# Patient Record
Sex: MALE | Race: WHITE | NOT HISPANIC OR LATINO | Employment: OTHER | URBAN - METROPOLITAN AREA
[De-identification: names, ages, dates, MRNs, and addresses within clinical notes are randomized per-mention and may not be internally consistent; named-entity substitution may affect disease eponyms.]

---

## 2017-02-14 ENCOUNTER — HOSPITAL ENCOUNTER (OUTPATIENT)
Dept: MRI IMAGING | Facility: HOSPITAL | Age: 57
Discharge: HOME | End: 2017-02-14
Attending: INTERNAL MEDICINE | Admitting: INTERNAL MEDICINE

## 2017-02-14 DIAGNOSIS — C31.1 PRIMARY SQUAMOUS CELL CARCINOMA OF ETHMOIDAL SINUS (HCC): ICD-10-CM

## 2017-02-14 PROCEDURE — 70553 MRI BRAIN STEM W/O & W/DYE: CPT

## 2017-02-14 PROCEDURE — 0 GADOBENATE DIMEGLUMINE 529 MG/ML SOLUTION: Performed by: INTERNAL MEDICINE

## 2017-02-14 PROCEDURE — A9577 INJ MULTIHANCE: HCPCS | Performed by: INTERNAL MEDICINE

## 2017-02-14 RX ADMIN — GADOBENATE DIMEGLUMINE 17 ML: 529 INJECTION, SOLUTION INTRAVENOUS at 09:16

## 2017-02-21 ENCOUNTER — APPOINTMENT (OUTPATIENT)
Dept: LAB | Facility: HOSPITAL | Age: 57
End: 2017-02-21

## 2017-02-21 ENCOUNTER — APPOINTMENT (OUTPATIENT)
Dept: ONCOLOGY | Facility: CLINIC | Age: 57
End: 2017-02-21

## 2017-02-21 ENCOUNTER — OFFICE VISIT (OUTPATIENT)
Dept: ONCOLOGY | Facility: CLINIC | Age: 57
End: 2017-02-21

## 2017-02-21 ENCOUNTER — LAB (OUTPATIENT)
Dept: LAB | Facility: HOSPITAL | Age: 57
End: 2017-02-21

## 2017-02-21 VITALS
HEART RATE: 78 BPM | SYSTOLIC BLOOD PRESSURE: 100 MMHG | OXYGEN SATURATION: 99 % | BODY MASS INDEX: 26.72 KG/M2 | TEMPERATURE: 97.8 F | HEIGHT: 69 IN | RESPIRATION RATE: 14 BRPM | WEIGHT: 180.4 LBS | DIASTOLIC BLOOD PRESSURE: 68 MMHG

## 2017-02-21 DIAGNOSIS — C31.1 PRIMARY SQUAMOUS CELL CARCINOMA OF ETHMOIDAL SINUS (HCC): Primary | ICD-10-CM

## 2017-02-21 DIAGNOSIS — C31.1 PRIMARY SQUAMOUS CELL CARCINOMA OF ETHMOIDAL SINUS (HCC): ICD-10-CM

## 2017-02-21 LAB
BASOPHILS # BLD AUTO: 0.03 10*3/MM3 (ref 0–0.1)
BASOPHILS NFR BLD AUTO: 0.6 % (ref 0–1.1)
DEPRECATED RDW RBC AUTO: 42.2 FL (ref 37–49)
EOSINOPHIL # BLD AUTO: 0.2 10*3/MM3 (ref 0–0.36)
EOSINOPHIL NFR BLD AUTO: 4.1 % (ref 1–5)
ERYTHROCYTE [DISTWIDTH] IN BLOOD BY AUTOMATED COUNT: 12.9 % (ref 11.7–14.5)
HCT VFR BLD AUTO: 41.4 % (ref 40–49)
HGB BLD-MCNC: 13.8 G/DL (ref 13.5–16.5)
IMM GRANULOCYTES # BLD: 0.04 10*3/MM3 (ref 0–0.03)
IMM GRANULOCYTES NFR BLD: 0.8 % (ref 0–0.5)
LYMPHOCYTES # BLD AUTO: 1.25 10*3/MM3 (ref 1–3.5)
LYMPHOCYTES NFR BLD AUTO: 25.4 % (ref 20–49)
MCH RBC QN AUTO: 29.7 PG (ref 27–33)
MCHC RBC AUTO-ENTMCNC: 33.3 G/DL (ref 32–35)
MCV RBC AUTO: 89.2 FL (ref 83–97)
MONOCYTES # BLD AUTO: 0.53 10*3/MM3 (ref 0.25–0.8)
MONOCYTES NFR BLD AUTO: 10.8 % (ref 4–12)
NEUTROPHILS # BLD AUTO: 2.87 10*3/MM3 (ref 1.5–7)
NEUTROPHILS NFR BLD AUTO: 58.3 % (ref 39–75)
NRBC BLD MANUAL-RTO: 0.6 /100 WBC (ref 0–0)
PLATELET # BLD AUTO: 227 10*3/MM3 (ref 150–375)
PMV BLD AUTO: 9.4 FL (ref 8.9–12.1)
RBC # BLD AUTO: 4.64 10*6/MM3 (ref 4.3–5.5)
WBC NRBC COR # BLD: 4.92 10*3/MM3 (ref 4–10)

## 2017-02-21 PROCEDURE — 99214 OFFICE O/P EST MOD 30 MIN: CPT | Performed by: INTERNAL MEDICINE

## 2017-02-21 PROCEDURE — 85025 COMPLETE CBC W/AUTO DIFF WBC: CPT | Performed by: INTERNAL MEDICINE

## 2017-02-21 PROCEDURE — 36416 COLLJ CAPILLARY BLOOD SPEC: CPT | Performed by: INTERNAL MEDICINE

## 2017-02-21 NOTE — PROGRESS NOTES
[facility] GROUP OUTPATIENT FOLLOW UP CLINIC VISIT    REASON FOR FOLLOW-UP:      1. 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   2. Status post resection on 09/17/2012 at [facility].   3. Adjuvant concurrent chemoradiation initiated on 10/25/2012.   4. Sixth and final cycle of weekly carboplatin/paclitaxel administered on 11/29/2012.   5. Last radiation on 12/03/2012.   6. Hospitalized from 12/06/2012 to 12/18/2012 with falls. 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   7. Followed by Dr. Cassidy, Neurosurgery at [facility] and MRI performed at [facility] showed a 2.5 cm mass in the right frontal lobe. The patient underwent right frontal craniotomy for resection of the tumor. It was a cystic right frontal tumor. Dr. Cassidy was able to remove it en bloc on 7/20/2015. The pathology was consistent with a non-small cell carcinoma, high grade. It was a 2.3 x 2.3 x 0.6 tumor consistent with high grade non-small cell carcinoma consistent with previous primary.  8. He became less interactive with urinary incontinence, and intracranial recurrence was again noted with repeat right frontal craniotomy at  on 11/15/2016.  The operative note indicates complete removal of the tumor.      9. PET scan and MRI of the brain on 12/14/2016 and 12/18/2016 respectively show no metastatic disease and no definite evidence of residual intracranial malignancy.  Dr. Tran does not plan radiation therapy.  Observation pursued.  [facility] NGS assessment requested.  10. 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  PD-L1 testing pending as of 2/21/2017.  11. MRI imaging on 2/14/2017 shows evidence of recurrence in total measuring 19 x 16 x 15 mm.  Dr. Tran plans radiation.      HISTORY OF PRESENT ILLNESS:  Irving Albarran is a 56 y.o. male  "who returns today for follow up of the above issue to review MRI imaging.  He hasn't been doing as well lately.  His family has noticed some personality changes.  He is a little bit more withdrawn.  He has also been having some headaches in the right frontal area.  He takes an aspirin about twice weekly for this.  No focal weakness.  No seizures.      PAST MEDICAL, SURGICAL, FAMILY, AND SOCIAL HISTORIES were reviewed with the patient and in the electronic medical record, and were updated if indicated.    ALLERGIES:  Allergies   Allergen Reactions   • Morphine And Related        MEDICATIONS:  The medication list has been reviewed with the patient by the medical assistant, and the list has been updated in the electronic medical record, which I reviewed.  Medication dosages and frequencies were confirmed to be accurate.    REVIEW OF SYSTEMS:  PAIN:  See Vital Signs below.  GENERAL:  No fevers, chills, night sweats, or unintended weight loss.  See history of present illness  SKIN:  No rash  HEME/LYMPH:  No abnormal bleeding.  No palpable lymphadenopathy.  EYES:  No vision changes or diplopia.  Some tearing of the left eye.  ENT:  No sore throat or difficulty swallowing.  RESPIRATORY:  No cough, shortness of breath, hemoptysis, or wheezing.  Occasional tachypnea which is a behavioral issue.  CARDIOVASCULAR:  No chest pain, palpitations, orthopnea, or dyspnea on exertion.  GASTROINTESTINAL:  No abdominal pain, nausea, vomiting, constipation, diarrhea, melena, or hematochezia.  GENITOURINARY:  No dysuria or hematuria.  MUSCULOSKELETAL:  No joint pain, swelling, or erythema.  NEUROLOGIC:  No dizziness, loss of consciousness, or seizures.  PSYCHIATRIC:  See history of present illness    Vitals:    02/21/17 1407   BP: 100/68   Pulse: 78   Resp: 14   Temp: 97.8 °F (36.6 °C)   TempSrc: Oral   SpO2: 99%   Weight: 180 lb 6.4 oz (81.8 kg)   Height: 68.9\" (175 cm)   PainSc: 0-No pain       PHYSICAL EXAMINATION:  GENERAL:  " Well-developed well-nourished male; awake, alert and oriented, in no acute distress.  SKIN:  Small erythematous and tender papules on the left frontal scalp improved  HEAD:  Microcephaly is noted.  Healed surgical incision at the frontal hairline.  EYES:  Pupils equal, round and reactive to light.  Extraocular movements intact.  Conjunctivae normal.  EARS:  Hearing intact.  NOSE:  Septum midline.  No excoriations or nasal discharge.  MOUTH:  No stomatitis or ulcers.  Lips are normal.  THROAT:  Oropharynx without lesions or exudates.  NECK:  Supple with good range of motion; no thyromegaly or masses; no JVD or bruits.  LYMPHATICS:  No cervical, supraclavicular, axillary, or inguinal lymphadenopathy.  CHEST:  Lungs are clear to auscultation bilaterally.  No wheezes, rales, or rhonchi.  HEART:  Regular rate; normal rhythm.  No murmurs, gallops or rubs.  ABDOMEN:  Soft, non-tender, non-distended.  Normal active bowel sounds.  No organomegaly.  EXTREMITIES:  No clubbing, cyanosis, or edema.  NEUROLOGICAL:  No focal neurologic deficits.    DIAGNOSTIC DATA:  Lab Results   Component Value Date    WBC 4.92 02/21/2017    HGB 13.8 02/21/2017    HCT 41.4 02/21/2017    MCV 89.2 02/21/2017     02/21/2017       IMAGING:  MRI brain imaging from 2/14/2017 personally reviewed.  19 mm dural based area of enhancement consistent with recurrence present deep to the craniotomy flap in the right frontal area.      ASSESSMENT:  This is a 56 y.o. male with:   1.  History of stage IV poorly differentiated carcinoma of the left ethmoid sinus, status post surgery followed by chemoradiation.  2.  Frontal lobe metastatic lesion, resected, consistent with his previous tumor.  Second recurrence also now resected on 11/15/2016.  We initially observed.  There is no evidence for local recurrence on the most recent MRI.  I spoke with Dr. Tran with radiation oncology this morning and he does plan for 5-6 weeks of radiation.  I discussed the  case with Dr. Pelletier with our practice and we do not favor any concurrent systemic therapy under the circumstances.  Down the road, if there is further evidence for recurrence and radiation and surgery are no longer options, we will discuss the possibility of some systemic therapy if possible although this is a difficult situation and systemic drugs may not be very effective.  PD-L1 testing is complete but the result is not immediately available to me and we're working on getting this result.  Mutation burden on next generation sequencing was low.  Otherwise neck generation sequencing did not provide any meaningful targets for therapy.    3.  Headache likely secondary to recurrent carcinoma: Hopefully this improves with radiation.  4.  Personality changes: Again, this is likely secondary to progressive and recurrent intracranial metastatic disease.  Hopefully this improves with time.  No other changes on MRI imaging to suggest a reason for his personality change.    PLAN:  1.  Dr. Tran plans to complete radiation therapy.  I anticipate this will be done somewhere around the end of March.  2.  We do not plan for any systemic therapy at this time concurrent with radiation.  3.  I will plan to see him back around the end of May with MRI imaging of the brain done prior to that.

## 2017-02-24 ENCOUNTER — APPOINTMENT (OUTPATIENT)
Dept: RADIATION ONCOLOGY | Facility: HOSPITAL | Age: 57
End: 2017-02-24

## 2017-02-24 ENCOUNTER — OFFICE VISIT (OUTPATIENT)
Dept: RADIATION ONCOLOGY | Facility: HOSPITAL | Age: 57
End: 2017-02-24

## 2017-02-24 VITALS
SYSTOLIC BLOOD PRESSURE: 119 MMHG | WEIGHT: 182 LBS | HEART RATE: 65 BPM | OXYGEN SATURATION: 95 % | TEMPERATURE: 97 F | DIASTOLIC BLOOD PRESSURE: 71 MMHG | RESPIRATION RATE: 16 BRPM | BODY MASS INDEX: 26.95 KG/M2

## 2017-02-24 DIAGNOSIS — C31.1 PRIMARY SQUAMOUS CELL CARCINOMA OF ETHMOIDAL SINUS (HCC): Primary | ICD-10-CM

## 2017-02-24 PROCEDURE — 99204 OFFICE O/P NEW MOD 45 MIN: CPT

## 2017-02-24 PROCEDURE — 77334 RADIATION TREATMENT AID(S): CPT

## 2017-02-24 PROCEDURE — G0463 HOSPITAL OUTPT CLINIC VISIT: HCPCS

## 2017-02-24 PROCEDURE — 77263 THER RADIOLOGY TX PLNG CPLX: CPT

## 2017-02-24 PROCEDURE — 77290 THER RAD SIMULAJ FIELD CPLX: CPT

## 2017-02-26 PROCEDURE — 77370 RADIATION PHYSICS CONSULT: CPT

## 2017-02-27 NOTE — PROGRESS NOTES
2/27/2017      Radiation Therapy Consultation:  Mr. Irving Albarran   ( 1960 )    History of Present Illness:  Mr. Irving Albarran is a 56 y.o. male who is here to discuss treatment options for his thrice recurrent SCC of the sinuses. . He is referred by Dr. Gonzalez.  Irving's cancer began in the summer of 2012 with involvement of the left frontal, medial right frontal, ethmoid, and sphenoid sinuses. He had surgery for this at [facility] followed by chemoradiation here, concluding in October of 2012. Since then he has been followed at [facility] and has had two intracranial recurrences treated by surgery. In November of 2016 MRI revealed changes in the operative site on his RIGHT, adjacent to and underlying the right frontal pole just above the right orbit. Further MRI comparisons in December of 2016 and last week show steady increase in size of the area in question and abnormal enhancement of same. No further surgery nor biopsy is planned. Irving and his sister-in-law, Raiza are here today to discuss radiation therapy in a salvage attempt as recommended by Dr. Cassidy and Dr. Gnozalez.    Active Problem List:     Patient Active Problem List   Diagnosis   • Carcinoma   • Primary squamous cell carcinoma of ethmoidal sinus   • Microcephaly        Past Medical History: he  has a past medical history of Carcinoma; Delusions; Depression; Learning disability; and Mood swings.    Past Surgical History:  he has a past surgical history that includes Sinus surgery (09/2012) and Cataract extraction.    Meds:    Current Outpatient Prescriptions:   •  FLUoxetine (PROzac) 40 MG capsule, Take 40 mg by mouth daily., Disp: , Rfl:   •  OLANZapine (ZyPREXA) 20 MG tablet, , Disp: , Rfl: 0  •  QUEtiapine (SEROquel) 400 MG tablet, Take 400 mg by mouth every night., Disp: , Rfl:     Allergies:  is allergic to morphine and related.    Family History:  his family history includes Diabetes in his maternal grandmother; Heart disease in his father;  Hypertension in his brother, mother, and sister; Kidney cancer (age of onset: 77) in his father; Melanoma in his mother.    Social History:  he  reports that he has never smoked. He does not have any smokeless tobacco history on file. He reports that he drinks alcohol. He reports that he does not use illicit drugs.    Pertinent Findings on   Review of Systems - Oncology Irving's ROS in negative because he never complains about anything. Raiza says he has a right sided headache about once a week for which he takes a single Tylenol tablet with prompt relief.    Vital Signs    Vitals:    02/24/17 1508   BP: 119/71   Pulse: 65   Resp: 16   Temp: 97 °F (36.1 °C)   TempSrc: Oral   SpO2: 95%   Weight: 182 lb (82.6 kg)   PainSc: 0-No pain       Pertinent Findings on Problem Focused Exam:  Physical Exam  No physical findings pertinent  to the purpose of their visit today. Specifically he is neurologically intact and in amazingly good shape for what he has been thru over the past five years.    Pertinent Findings on Problem Focused Imaging:  Dr. Porter describes, on MRI of 02/21/17, enlargement of the solid enhancing area deep to the right craniotomy flap and the development of a new site of rim-enhancing tissue adjacent. The combination of the two findings measure 19 x 16 x 15 mm; dural based over the far inferior-lateral right frontal lobe deep to the inferior portion of the craniotomy flap.    Karnofsky Performance Status:  100 - Fully active, able to carry on all pre-disease performed without restriction    Impression: T4b  N0   M x cancer of of paranasal sinuses, now recurrent intracranially as above described.    Summary of Our Discussion :      We discussed the diagnosis, stage, standard of care in the [place]. We discussed the  intent of the treatment (curative, palliative, prophylactic). We discussed the role of radiation therapy in the treatment plan both in generality and in specific. With regard to the latter  we discussed the logistics of treatment, the number of treatments, the side effects,  both probable and possible. We discussed potential after-effects,  both probable and possible. We discussed both intra-treatment and post-treatment coordination of care.     We discussed the planning process which will include immobilization devices required to assure high precision treatment and we discussed the planning CT scan (which will not require administration of any contrast agent).  All questions answered.     We began the planning process today with manufacture of the immobilization devices and the planning scan. We intend to begin actual treatment as soon as the computerized treatment planning can be finished.      Plan: Final decision on time/dose fractionation must await several hours of dosimetric work. The consultaiton will be addended at that time.        I appreciate being asked to see  Priyank Irving Deion in consultation.      Maurizio Tran MD         On 02/24/17 , I was with PriyankIrving Marcysavita  And  from 3:13 pm  until  4:05 pm  of which  45 minutes was face to face  Of that face to face time, 45 minutes  was spent in counseling and coordination of care as indicated in the Summary of Our Discussion, above.

## 2017-03-01 ENCOUNTER — APPOINTMENT (OUTPATIENT)
Dept: RADIATION ONCOLOGY | Facility: HOSPITAL | Age: 57
End: 2017-03-01

## 2017-03-02 PROCEDURE — 77301 RADIOTHERAPY DOSE PLAN IMRT: CPT

## 2017-03-02 PROCEDURE — 77470 SPECIAL RADIATION TREATMENT: CPT

## 2017-03-03 PROCEDURE — 77300 RADIATION THERAPY DOSE PLAN: CPT

## 2017-03-03 PROCEDURE — 77470 SPECIAL RADIATION TREATMENT: CPT

## 2017-03-06 PROCEDURE — 77338 DESIGN MLC DEVICE FOR IMRT: CPT

## 2017-03-06 PROCEDURE — 77427 RADIATION TX MANAGEMENT X5: CPT

## 2017-03-06 PROCEDURE — 77014 CHG CT GUIDANCE RADIATION THERAPY FLDS PLACEMENT: CPT

## 2017-03-06 PROCEDURE — 77386: CPT

## 2017-03-06 PROCEDURE — 77386 CHG INTENSITY MODULATED RADIATION TX DLVR COMPLEX: CPT

## 2017-03-07 PROCEDURE — 77386 CHG INTENSITY MODULATED RADIATION TX DLVR COMPLEX: CPT

## 2017-03-07 PROCEDURE — 77014 CHG CT GUIDANCE RADIATION THERAPY FLDS PLACEMENT: CPT

## 2017-03-07 PROCEDURE — 77386: CPT

## 2017-03-08 PROCEDURE — 77014 CHG CT GUIDANCE RADIATION THERAPY FLDS PLACEMENT: CPT

## 2017-03-08 PROCEDURE — 77386 CHG INTENSITY MODULATED RADIATION TX DLVR COMPLEX: CPT

## 2017-03-08 PROCEDURE — 77386: CPT

## 2017-03-09 PROCEDURE — 77336 RADIATION PHYSICS CONSULT: CPT

## 2017-03-09 PROCEDURE — 77386: CPT

## 2017-03-09 PROCEDURE — 77014 CHG CT GUIDANCE RADIATION THERAPY FLDS PLACEMENT: CPT

## 2017-03-09 PROCEDURE — 77386 CHG INTENSITY MODULATED RADIATION TX DLVR COMPLEX: CPT

## 2017-03-10 ENCOUNTER — OFFICE VISIT (OUTPATIENT)
Dept: INTERNAL MEDICINE | Facility: CLINIC | Age: 57
End: 2017-03-10

## 2017-03-10 ENCOUNTER — RADIATION ONCOLOGY WEEKLY ASSESSMENT (OUTPATIENT)
Dept: RADIATION ONCOLOGY | Facility: HOSPITAL | Age: 57
End: 2017-03-10

## 2017-03-10 VITALS
HEIGHT: 69 IN | OXYGEN SATURATION: 98 % | DIASTOLIC BLOOD PRESSURE: 90 MMHG | SYSTOLIC BLOOD PRESSURE: 102 MMHG | HEART RATE: 84 BPM | WEIGHT: 182 LBS | BODY MASS INDEX: 26.96 KG/M2

## 2017-03-10 DIAGNOSIS — F79 MENTAL RETARDATION: ICD-10-CM

## 2017-03-10 DIAGNOSIS — C31.1 PRIMARY SQUAMOUS CELL CARCINOMA OF ETHMOIDAL SINUS (HCC): Primary | ICD-10-CM

## 2017-03-10 DIAGNOSIS — L29.9 PRURITIC CONDITION: ICD-10-CM

## 2017-03-10 DIAGNOSIS — Z00.00 ANNUAL PHYSICAL EXAM: ICD-10-CM

## 2017-03-10 DIAGNOSIS — N40.0 BENIGN NON-NODULAR PROSTATIC HYPERPLASIA, PRESENCE OF LOWER URINARY TRACT SYMPTOMS UNSPECIFIED: ICD-10-CM

## 2017-03-10 PROCEDURE — 93000 ELECTROCARDIOGRAM COMPLETE: CPT | Performed by: INTERNAL MEDICINE

## 2017-03-10 PROCEDURE — 71020 CHG CHEST X-RAY 2 VW: CPT | Performed by: RADIOLOGY

## 2017-03-10 PROCEDURE — 77386: CPT

## 2017-03-10 PROCEDURE — 71020 XR CHEST 2 VW: CPT | Performed by: INTERNAL MEDICINE

## 2017-03-10 PROCEDURE — 99396 PREV VISIT EST AGE 40-64: CPT | Performed by: INTERNAL MEDICINE

## 2017-03-10 PROCEDURE — 77014 CHG CT GUIDANCE RADIATION THERAPY FLDS PLACEMENT: CPT

## 2017-03-10 PROCEDURE — 77386 CHG INTENSITY MODULATED RADIATION TX DLVR COMPLEX: CPT

## 2017-03-10 RX ORDER — HYDROXYZINE HYDROCHLORIDE 10 MG/1
10 TABLET, FILM COATED ORAL 3 TIMES DAILY PRN
Qty: 30 TABLET | Refills: 0 | Status: SHIPPED | OUTPATIENT
Start: 2017-03-10 | End: 2017-05-16 | Stop reason: SDUPTHER

## 2017-03-10 NOTE — PROGRESS NOTES
Subjective   Irving Albarran is a 56 y.o. male.     HPI Comments: Annual Physical New pt Here to get established Has Squamous cell ca of Ethmoid sinus       The following portions of the patient's history were reviewed and updated as appropriate: allergies, current medications, past family history, past medical history, past social history, past surgical history and problem list.    Review of Systems   Constitutional:        Feeling fine    Eyes: Negative.    Respiratory: Negative.    Cardiovascular: Negative.  Negative for chest pain and palpitations.   Gastrointestinal: Negative.    Genitourinary:        Sister in law Shireen indicates pt has to take a long time to urinate   Musculoskeletal: Negative.    Neurological: Positive for headaches (Has headaches from cancer).        Has squamous cell ca of ethmoid sinus W/ brain mets Goes to [facility] for craniotomies Has had chemo & gets Rad TX here @ [facility]   Psychiatric/Behavioral: The patient is hyperactive.         Is moderate mental retardation & asociated behavior D/O  On TX & see psychiatrist       Objective   Physical Exam   Constitutional: He is oriented to person, place, and time. He appears well-developed and well-nourished.   HENT:   Head: Normocephalic.   Nose: Nose normal.   Mouth/Throat: Oropharynx is clear and moist.   Canals impacted bialt   Eyes: Conjunctivae and EOM are normal. Pupils are equal, round, and reactive to light.   Lens implants   Neck: Normal range of motion. Neck supple.   Cardiovascular: Normal rate, regular rhythm, normal heart sounds and intact distal pulses.    Repeat 110/80   Pulmonary/Chest: Effort normal and breath sounds normal.   Abdominal: Soft. Bowel sounds are normal. He exhibits no distension and no mass. There is no tenderness.   Genitourinary: Rectum normal and penis normal.   Genitourinary Comments: Prostate moderately enlarged & firm   Musculoskeletal: Normal range of motion.   Neurological: He is alert and oriented to person,  place, and time. He has normal reflexes.   Skin:   Multiple craniotomy scars on vertex of scalp   Vitals reviewed.        Assessment/Plan   Irving was seen today for annual exam.    Diagnoses and all orders for this visit:    Primary squamous cell carcinoma of ethmoidal sinus    Annual physical exam  -     CBC Auto Differential; Future  -     Comprehensive Metabolic Panel; Future  -     Lipid Panel; Future  -     TSH; Future  -     T4, Free; Future  -     XR Chest 2 View    Mental retardation    Pruritic condition  -     hydrOXYzine (ATARAX) 10 MG tablet; Take 1 tablet by mouth 3 (Three) Times a Day As Needed for Itching.    Benign non-nodular prostatic hyperplasia, presence of lower urinary tract symptoms unspecified  -     PSA; Future    Other orders  -     ECG 12 Lead      ECG 12 Lead  Date/Time: 3/10/2017 10:09 AM  Performed by: KUSHAL VILLARREAL  Authorized by: KUSHAL VILLARREAL   Rhythm: sinus rhythm  Conduction: conduction normal  ST Segments: ST segments normal  T Waves: T waves normal  Other: no other findings  Clinical impression: normal ECG  Comments: No priors

## 2017-03-13 PROCEDURE — 77386: CPT

## 2017-03-13 PROCEDURE — 77386 CHG INTENSITY MODULATED RADIATION TX DLVR COMPLEX: CPT

## 2017-03-13 PROCEDURE — 77014 CHG CT GUIDANCE RADIATION THERAPY FLDS PLACEMENT: CPT

## 2017-03-13 PROCEDURE — 77427 RADIATION TX MANAGEMENT X5: CPT

## 2017-03-14 PROCEDURE — 77386 CHG INTENSITY MODULATED RADIATION TX DLVR COMPLEX: CPT

## 2017-03-14 PROCEDURE — 77014 CHG CT GUIDANCE RADIATION THERAPY FLDS PLACEMENT: CPT

## 2017-03-14 PROCEDURE — 77386: CPT

## 2017-03-15 ENCOUNTER — RADIATION ONCOLOGY WEEKLY ASSESSMENT (OUTPATIENT)
Dept: RADIATION ONCOLOGY | Facility: HOSPITAL | Age: 57
End: 2017-03-15

## 2017-03-15 VITALS
SYSTOLIC BLOOD PRESSURE: 130 MMHG | BODY MASS INDEX: 26.95 KG/M2 | WEIGHT: 182 LBS | HEART RATE: 72 BPM | OXYGEN SATURATION: 99 % | DIASTOLIC BLOOD PRESSURE: 85 MMHG

## 2017-03-15 DIAGNOSIS — C31.1 PRIMARY SQUAMOUS CELL CARCINOMA OF ETHMOIDAL SINUS (HCC): Primary | ICD-10-CM

## 2017-03-15 PROCEDURE — 77014 CHG CT GUIDANCE RADIATION THERAPY FLDS PLACEMENT: CPT

## 2017-03-15 PROCEDURE — 77386 CHG INTENSITY MODULATED RADIATION TX DLVR COMPLEX: CPT

## 2017-03-15 PROCEDURE — 77386: CPT

## 2017-03-15 NOTE — PROGRESS NOTES
Physician Weekly Management Note    Diagnosis:     1. Primary squamous cell carcinoma of ethmoidal sinus        Reason for Visit:   Tx 8    Concurrent Chemo:   No    Notes on Treatment course, Films, Medical progress and Plan:  Doing well. No problems or questions, cont on.    ROS:    Constitutional - Normal - no complaints of fatigue, denies lack of appetite, fever, night sweats or change in weight.  Neck - Normal - denies neck masses, muscle weakness, neck pain, decreased range of motion or swelling.  Cardiovascular - Normal - denies arrhythmias, chest pain, dyspnea, edema, orthopnea or palpitations.  Respiratory - Normal - denies cough, dyspnea, hemoptysis, hiccoughs, pleuritic chest pain or wheezing.  Gastrointestinal - Normal - no complaints of constipation, abdominal pain, diarrhea, heartburn/dyspepsia, hematemesis, hemorrhoids, melena or GI bleeding, nausea, pain or cramping or vomiting.    PHYSICAL EXAM:  Vitals:    03/15/17 1007   BP: 130/85   Pulse: 72   SpO2: 99%   Weight: 182 lb (82.6 kg)   PainSc: 0-No pain       Constitutional - Normal - no evidence of impaired alertness, inadequate appearance, premature or advanced chronologic age, uncooperativeness, altered mood, affect or disorientation.  Neck - Normal - no evidence of tender or enlarged lymph nodes, neck abnormalities, restricted range of motion or enlarged thyroid.  Chest - Normal - no evidence of chest abnormalities, tender or enlarged lymph nodes.  Respiratory - Normal - no evidence of abnormal breat sounds, chest abnormalities on palpation and chest abnormalities on percussion and normal breath sounds.  Hematologic/Lymphatic - Normal - no evidence of tender or enlarged axillary lymph nodes nor tender or enlarged neck nodes.    Performance Status:  (2) Ambulatory and capable of self care, unable to carry out work activity, up and about > 50% or waking hours    Problem added:  No problems updated.  Medications added: No orders of the defined  "types were placed in this encounter.    Ancillary referrals made: No orders of the defined types were placed in this encounter.      Technical aspects reviewed:  Weekly OBI approved if applicable? Yes  Weekly port films approved?   Yes  Change requests noted if applicable?  No  Patient setup and plan reviewed?  Yes    Chart Reviewed:  Continue current treatment plan?   Yes  Treatment plan change requested?  No    I have reviewed and marked as \"reviewed\" the current medications, allergies and problem list in the patients EMR.  I have reviewed the patient's medical, surgical  history in detail, reviewed any pertinent lab work and updated the computerized patient record if needed.    Patient's Care Team:  Patient Care Team:  Solo Alonso MD as PCP - General (Internal Medicine)    Seen and approved by:  Ella Pandya MD, 03/15/2017  "

## 2017-03-16 PROCEDURE — 77386: CPT

## 2017-03-16 PROCEDURE — 77386 CHG INTENSITY MODULATED RADIATION TX DLVR COMPLEX: CPT

## 2017-03-16 PROCEDURE — 77014 CHG CT GUIDANCE RADIATION THERAPY FLDS PLACEMENT: CPT

## 2017-03-16 PROCEDURE — 77336 RADIATION PHYSICS CONSULT: CPT

## 2017-03-17 PROCEDURE — 77386 CHG INTENSITY MODULATED RADIATION TX DLVR COMPLEX: CPT

## 2017-03-17 PROCEDURE — 77014 CHG CT GUIDANCE RADIATION THERAPY FLDS PLACEMENT: CPT

## 2017-03-17 PROCEDURE — 77386: CPT

## 2017-03-20 ENCOUNTER — RADIATION ONCOLOGY WEEKLY ASSESSMENT (OUTPATIENT)
Dept: RADIATION ONCOLOGY | Facility: HOSPITAL | Age: 57
End: 2017-03-20

## 2017-03-20 VITALS
DIASTOLIC BLOOD PRESSURE: 73 MMHG | TEMPERATURE: 97 F | BODY MASS INDEX: 26.95 KG/M2 | WEIGHT: 182 LBS | SYSTOLIC BLOOD PRESSURE: 113 MMHG | RESPIRATION RATE: 16 BRPM | HEART RATE: 86 BPM | OXYGEN SATURATION: 98 %

## 2017-03-20 DIAGNOSIS — C31.1 PRIMARY SQUAMOUS CELL CARCINOMA OF ETHMOIDAL SINUS (HCC): Primary | ICD-10-CM

## 2017-03-20 PROCEDURE — 77427 RADIATION TX MANAGEMENT X5: CPT

## 2017-03-20 PROCEDURE — 77386 CHG INTENSITY MODULATED RADIATION TX DLVR COMPLEX: CPT

## 2017-03-20 PROCEDURE — 77014 CHG CT GUIDANCE RADIATION THERAPY FLDS PLACEMENT: CPT

## 2017-03-20 PROCEDURE — 77386: CPT

## 2017-03-20 NOTE — PROGRESS NOTES
Physician Weekly Management Note    Diagnosis:     1. Primary squamous cell carcinoma of ethmoidal sinus        Reason for Visit:   Tx 11    Concurrent Chemo:   No    Notes on Treatment course, Films, Medical progress and Plan:  Complaints of mild erythema and swelling of the left eye over the weekend. States he has a mild headache there. Slight skin change in portal shape in center, eyes both fine now. Encouraged tylenol as needed and reviewed field shape with patient and sister in law. Let us know if things worsen again.    ROS:    Constitutional - Normal - no complaints of fatigue, denies lack of appetite, fever, night sweats or change in weight.  Neck - Normal - denies neck masses, muscle weakness, neck pain, decreased range of motion or swelling.  Cardiovascular - Normal - denies arrhythmias, chest pain, dyspnea, edema, orthopnea or palpitations.  Respiratory - Normal - denies cough, dyspnea, hemoptysis, hiccoughs, pleuritic chest pain or wheezing.  Gastrointestinal - Normal - no complaints of constipation, abdominal pain, diarrhea, heartburn/dyspepsia, hematemesis, hemorrhoids, melena or GI bleeding, nausea, pain or cramping or vomiting.    PHYSICAL EXAM:  Vitals:    03/20/17 1000   BP: 113/73   Pulse: 86   Resp: 16   Temp: 97 °F (36.1 °C)   TempSrc: Oral   SpO2: 98%   Weight: 182 lb (82.6 kg)   PainSc:   9   PainLoc: Head       Constitutional - Normal - no evidence of impaired alertness, inadequate appearance, premature or advanced chronologic age, uncooperativeness, altered mood, affect or disorientation.  Neck - Normal - no evidence of tender or enlarged lymph nodes, neck abnormalities, restricted range of motion or enlarged thyroid.  Chest - Normal - no evidence of chest abnormalities, tender or enlarged lymph nodes.  Respiratory - Normal - no evidence of abnormal breat sounds, chest abnormalities on palpation and chest abnormalities on percussion and normal breath sounds.  Hematologic/Lymphatic - Normal  "- no evidence of tender or enlarged axillary lymph nodes nor tender or enlarged neck nodes.    Performance Status:  (2) Ambulatory and capable of self care, unable to carry out work activity, up and about > 50% or waking hours    Problem added:  No problems updated.  Medications added: No orders of the defined types were placed in this encounter.    Ancillary referrals made: No orders of the defined types were placed in this encounter.      Technical aspects reviewed:  Weekly OBI approved if applicable? Yes  Weekly port films approved?   Yes  Change requests noted if applicable?  No  Patient setup and plan reviewed?  Yes    Chart Reviewed:  Continue current treatment plan?   Yes  Treatment plan change requested?  No    I have reviewed and marked as \"reviewed\" the current medications, allergies and problem list in the patients EMR.  I have reviewed the patient's medical, surgical  history in detail, reviewed any pertinent lab work and updated the computerized patient record if needed.    Patient's Care Team:  Patient Care Team:  Solo Alonso MD as PCP - General (Internal Medicine)    Seen and approved by:  Ella Pandya MD, 03/15/2017  "

## 2017-03-21 ENCOUNTER — LAB (OUTPATIENT)
Dept: INTERNAL MEDICINE | Facility: CLINIC | Age: 57
End: 2017-03-21

## 2017-03-21 DIAGNOSIS — Z00.00 ANNUAL PHYSICAL EXAM: ICD-10-CM

## 2017-03-21 DIAGNOSIS — N40.0 BENIGN NON-NODULAR PROSTATIC HYPERPLASIA, PRESENCE OF LOWER URINARY TRACT SYMPTOMS UNSPECIFIED: ICD-10-CM

## 2017-03-21 LAB
ALBUMIN SERPL-MCNC: 3.46 G/DL (ref 3.4–4.6)
ALBUMIN/GLOB SERPL: 1 G/DL
ALP SERPL-CCNC: 100 U/L (ref 46–116)
ALT SERPL W P-5'-P-CCNC: 27 U/L (ref 16–63)
ANION GAP SERPL CALCULATED.3IONS-SCNC: 7 MMOL/L
AST SERPL-CCNC: 17 U/L (ref 7–37)
BASOPHILS # BLD AUTO: 0.06 10*3/MM3 (ref 0–0.2)
BASOPHILS NFR BLD AUTO: 0.8 % (ref 0–2)
BILIRUB SERPL-MCNC: 0.4 MG/DL (ref 0.2–1)
BUN BLD-MCNC: 14 MG/DL (ref 6–22)
BUN/CREAT SERPL: 13 (ref 7–25)
CALCIUM SPEC-SCNC: 8.9 MG/DL (ref 8.6–10.5)
CHLORIDE SERPL-SCNC: 105 MMOL/L (ref 95–107)
CHOLEST SERPL-MCNC: 202 MG/DL (ref 0–200)
CO2 SERPL-SCNC: 31 MMOL/L (ref 23–32)
CREAT BLD-MCNC: 1.08 MG/DL (ref 0.7–1.3)
DEPRECATED RDW RBC AUTO: 43.8 FL (ref 37–54)
EOSINOPHIL # BLD AUTO: 0.14 10*3/MM3 (ref 0–0.7)
EOSINOPHIL NFR BLD AUTO: 1.9 % (ref 0–5)
ERYTHROCYTE [DISTWIDTH] IN BLOOD BY AUTOMATED COUNT: 13.2 % (ref 11.5–15)
GFR SERPL CREATININE-BSD FRML MDRD: 70 ML/MIN/1.73
GLOBULIN UR ELPH-MCNC: 3.3 GM/DL
GLUCOSE BLD-MCNC: 84 MG/DL (ref 70–100)
HCT VFR BLD AUTO: 43.3 % (ref 40.1–51)
HDLC SERPL-MCNC: 50 MG/DL (ref 40–81)
HGB BLD-MCNC: 14.1 G/DL (ref 13.7–17.5)
LDLC SERPL CALC-MCNC: 139 MG/DL (ref 0–100)
LDLC/HDLC SERPL: 2.79 {RATIO}
LYMPHOCYTES # BLD AUTO: 1.27 10*3/MM3 (ref 0.8–7)
LYMPHOCYTES NFR BLD AUTO: 17.5 % (ref 10–60)
MCH RBC QN AUTO: 30.3 PG (ref 26–34)
MCHC RBC AUTO-ENTMCNC: 32.6 G/DL (ref 31–37)
MCV RBC AUTO: 93.1 FL (ref 80–100)
MONOCYTES # BLD AUTO: 0.59 10*3/MM3 (ref 0–1)
MONOCYTES NFR BLD AUTO: 8.1 % (ref 0–13)
NEUTROPHILS # BLD AUTO: 5.2 10*3/MM3 (ref 1–11)
NEUTROPHILS NFR BLD AUTO: 71.7 % (ref 30–85)
PLATELET # BLD AUTO: 281 10*3/MM3 (ref 150–450)
PMV BLD AUTO: 10 FL (ref 6–12)
POTASSIUM BLD-SCNC: 4.5 MMOL/L (ref 3.3–5.3)
PROT SERPL-MCNC: 6.8 G/DL (ref 6.3–8.4)
PSA SERPL-MCNC: 0.81 NG/ML (ref 0.13–4)
RBC # BLD AUTO: 4.65 10*6/MM3 (ref 4.63–6.08)
SODIUM BLD-SCNC: 143 MMOL/L (ref 136–145)
T4 FREE SERPL-MCNC: 0.8 NG/DL (ref 0.93–1.7)
TRIGL SERPL-MCNC: 63 MG/DL (ref 0–150)
TSH SERPL DL<=0.05 MIU/L-ACNC: 0.93 MIU/ML (ref 0.4–4.2)
VLDLC SERPL-MCNC: 12.6 MG/DL
WBC NRBC COR # BLD: 7.26 10*3/MM3 (ref 5–10)

## 2017-03-21 PROCEDURE — 77386: CPT

## 2017-03-21 PROCEDURE — 80050 GENERAL HEALTH PANEL: CPT | Performed by: INTERNAL MEDICINE

## 2017-03-21 PROCEDURE — 77386 CHG INTENSITY MODULATED RADIATION TX DLVR COMPLEX: CPT

## 2017-03-21 PROCEDURE — 80061 LIPID PANEL: CPT | Performed by: INTERNAL MEDICINE

## 2017-03-21 PROCEDURE — 36415 COLL VENOUS BLD VENIPUNCTURE: CPT | Performed by: INTERNAL MEDICINE

## 2017-03-21 PROCEDURE — 77014 CHG CT GUIDANCE RADIATION THERAPY FLDS PLACEMENT: CPT

## 2017-03-21 PROCEDURE — 84153 ASSAY OF PSA TOTAL: CPT | Performed by: INTERNAL MEDICINE

## 2017-03-22 PROCEDURE — 77386: CPT

## 2017-03-22 PROCEDURE — 77386 CHG INTENSITY MODULATED RADIATION TX DLVR COMPLEX: CPT

## 2017-03-23 ENCOUNTER — RADIATION ONCOLOGY WEEKLY ASSESSMENT (OUTPATIENT)
Dept: RADIATION ONCOLOGY | Facility: HOSPITAL | Age: 57
End: 2017-03-23

## 2017-03-23 VITALS
TEMPERATURE: 97.6 F | DIASTOLIC BLOOD PRESSURE: 67 MMHG | RESPIRATION RATE: 16 BRPM | HEART RATE: 101 BPM | OXYGEN SATURATION: 98 % | SYSTOLIC BLOOD PRESSURE: 101 MMHG

## 2017-03-23 DIAGNOSIS — C31.1 PRIMARY SQUAMOUS CELL CARCINOMA OF ETHMOIDAL SINUS (HCC): Primary | ICD-10-CM

## 2017-03-23 PROCEDURE — 77336 RADIATION PHYSICS CONSULT: CPT

## 2017-03-23 PROCEDURE — 77386: CPT

## 2017-03-23 PROCEDURE — 77386 CHG INTENSITY MODULATED RADIATION TX DLVR COMPLEX: CPT

## 2017-03-23 PROCEDURE — 77014 CHG CT GUIDANCE RADIATION THERAPY FLDS PLACEMENT: CPT

## 2017-03-23 NOTE — PROGRESS NOTES
3/23/2017    Irving Albarran is a 56 y.o. male    Vitals:    03/23/17 1014   BP: 101/67   Pulse: 101   Resp: 16   Temp: 97.6 °F (36.4 °C)   SpO2: 98%       Weekly Management:  Allergies reviewed?   Yes  Problemlist reviewed?   Yes  Medication reviewed?   Yes   New medications given? no  Problem added?   no  Issues raised requiring referral to support services:no    Technical aspects reviewed:  Weekly port films approved?   Yes  Weekly OBI imaging reviewed? Yes  Patient setup and plan reviewed?   Yes  Change requests noted on port film?   no    Chart Reviewed:  Treatment plan change requested?   no  Continue current treatment plan?   Yes  Concurrent Chemo?  no  CBC reviewed?   no    Toxicities:   Essentially none.     Performance Status: 100 - Fully active, able to carry on all pre-disease performed without restriction    Reason for Visit: Radiation (14/25)      Notes on Treatment course, Films, Medical progress: Feels a little sun-burned on the left side of his forehead. No skin changes visible. Advised coca butter.         Seen and approved by:  Maurizio Tran MD  03/23/2017

## 2017-03-24 PROCEDURE — 77386 CHG INTENSITY MODULATED RADIATION TX DLVR COMPLEX: CPT

## 2017-03-24 PROCEDURE — 77386: CPT

## 2017-03-24 PROCEDURE — 77014 CHG CT GUIDANCE RADIATION THERAPY FLDS PLACEMENT: CPT

## 2017-03-27 PROCEDURE — 77386: CPT

## 2017-03-27 PROCEDURE — 77427 RADIATION TX MANAGEMENT X5: CPT

## 2017-03-27 PROCEDURE — 77386 CHG INTENSITY MODULATED RADIATION TX DLVR COMPLEX: CPT

## 2017-03-27 PROCEDURE — 77014 CHG CT GUIDANCE RADIATION THERAPY FLDS PLACEMENT: CPT

## 2017-03-28 PROCEDURE — 77014 CHG CT GUIDANCE RADIATION THERAPY FLDS PLACEMENT: CPT

## 2017-03-28 PROCEDURE — 77386: CPT

## 2017-03-28 PROCEDURE — 77386 CHG INTENSITY MODULATED RADIATION TX DLVR COMPLEX: CPT

## 2017-03-29 PROCEDURE — 77386: CPT

## 2017-03-29 PROCEDURE — 77014 CHG CT GUIDANCE RADIATION THERAPY FLDS PLACEMENT: CPT

## 2017-03-29 PROCEDURE — 77386 CHG INTENSITY MODULATED RADIATION TX DLVR COMPLEX: CPT

## 2017-03-29 NOTE — PROGRESS NOTES
3/29/2017    Irving Albarran is a 56 y.o. male    There were no vitals filed for this visit.    Weekly Management:  Allergies reviewed?   Yes  Problemlist reviewed?   Yes  Medication reviewed?   Yes   New medications given? no  Problem added?   no  Issues raised requiring referral to support services:no    Technical aspects reviewed:  Weekly port films approved?   Yes  Weekly OBI imaging reviewed? Yes  Patient setup and plan reviewed?   Yes  Change requests noted on port film?   no    Chart Reviewed:  Treatment plan change requested?   no  Continue current treatment plan?   Yes  Concurrent Chemo?  no  CBC reviewed?   no    Toxicities:   Minimal headache.     Performance Status: 90 - Able to carry on normal activity; minor signs or symptoms of disease     Reason for Visit: No chief complaint on file.      Notes on Treatment course, Films, Medical progress: Doing well. Continue on.         Seen and approved by:  Maurizio Tran MD  03/30/2017

## 2017-03-30 ENCOUNTER — RADIATION ONCOLOGY WEEKLY ASSESSMENT (OUTPATIENT)
Dept: RADIATION ONCOLOGY | Facility: HOSPITAL | Age: 57
End: 2017-03-30

## 2017-03-30 DIAGNOSIS — C31.1 PRIMARY SQUAMOUS CELL CARCINOMA OF ETHMOIDAL SINUS (HCC): Primary | ICD-10-CM

## 2017-03-30 PROCEDURE — 77386 CHG INTENSITY MODULATED RADIATION TX DLVR COMPLEX: CPT

## 2017-03-30 PROCEDURE — 77386: CPT

## 2017-03-30 PROCEDURE — 77336 RADIATION PHYSICS CONSULT: CPT

## 2017-03-30 PROCEDURE — 77014 CHG CT GUIDANCE RADIATION THERAPY FLDS PLACEMENT: CPT

## 2017-03-31 PROCEDURE — 77386: CPT

## 2017-03-31 PROCEDURE — 77014 CHG CT GUIDANCE RADIATION THERAPY FLDS PLACEMENT: CPT

## 2017-03-31 PROCEDURE — 77386 CHG INTENSITY MODULATED RADIATION TX DLVR COMPLEX: CPT

## 2017-04-01 ENCOUNTER — APPOINTMENT (OUTPATIENT)
Dept: RADIATION ONCOLOGY | Facility: HOSPITAL | Age: 57
End: 2017-04-01

## 2017-04-03 PROCEDURE — 77386: CPT

## 2017-04-03 PROCEDURE — 77386 CHG INTENSITY MODULATED RADIATION TX DLVR COMPLEX: CPT

## 2017-04-03 PROCEDURE — 77014 CHG CT GUIDANCE RADIATION THERAPY FLDS PLACEMENT: CPT

## 2017-04-03 PROCEDURE — 77427 RADIATION TX MANAGEMENT X5: CPT

## 2017-04-04 ENCOUNTER — RADIATION ONCOLOGY WEEKLY ASSESSMENT (OUTPATIENT)
Dept: RADIATION ONCOLOGY | Facility: HOSPITAL | Age: 57
End: 2017-04-04

## 2017-04-04 VITALS
SYSTOLIC BLOOD PRESSURE: 107 MMHG | HEART RATE: 86 BPM | RESPIRATION RATE: 16 BRPM | DIASTOLIC BLOOD PRESSURE: 74 MMHG | OXYGEN SATURATION: 99 % | TEMPERATURE: 97.2 F

## 2017-04-04 DIAGNOSIS — C31.1 PRIMARY SQUAMOUS CELL CARCINOMA OF ETHMOIDAL SINUS (HCC): Primary | ICD-10-CM

## 2017-04-04 PROCEDURE — 77014 CHG CT GUIDANCE RADIATION THERAPY FLDS PLACEMENT: CPT

## 2017-04-04 PROCEDURE — 77386: CPT

## 2017-04-04 PROCEDURE — 77386 CHG INTENSITY MODULATED RADIATION TX DLVR COMPLEX: CPT

## 2017-04-04 NOTE — PROGRESS NOTES
4/4/2017    Irving Albarran is a 56 y.o. male    Vitals:    04/04/17 1000   BP: 107/74   Pulse: 86   Resp: 16   Temp: 97.2 °F (36.2 °C)   SpO2: 99%       Weekly Management:  Allergies reviewed?   Yes  Problemlist reviewed?   Yes  Medication reviewed?   Yes   New medications given? no  Problem added?   no  Issues raised requiring referral to support services:no    Technical aspects reviewed:  Weekly port films approved?   Yes  Weekly OBI imaging reviewed? Yes  Patient setup and plan reviewed?   Yes  Change requests noted on port film?   no    Chart Reviewed:  Treatment plan change requested?   no  Continue current treatment plan?   Yes  Concurrent Chemo?  no  CBC reviewed?   no    Toxicities:  Essentially none     Performance Status: 90 - Able to carry on normal activity; minor signs or symptoms of disease     Reason for Visit: Radiation (22/25)      Notes on Treatment course, Films, Medical progress: Doing well. Finish on 04/07 and return here prn, dismiss back to Dr. Gonzalez.         Seen and approved by:  Maurizio Tran MD  04/04/2017

## 2017-04-05 PROCEDURE — 77014 CHG CT GUIDANCE RADIATION THERAPY FLDS PLACEMENT: CPT

## 2017-04-05 PROCEDURE — 77386: CPT

## 2017-04-05 PROCEDURE — 77386 CHG INTENSITY MODULATED RADIATION TX DLVR COMPLEX: CPT

## 2017-04-06 PROCEDURE — 77386 CHG INTENSITY MODULATED RADIATION TX DLVR COMPLEX: CPT

## 2017-04-06 PROCEDURE — 77336 RADIATION PHYSICS CONSULT: CPT

## 2017-04-06 PROCEDURE — 77014 CHG CT GUIDANCE RADIATION THERAPY FLDS PLACEMENT: CPT

## 2017-04-06 PROCEDURE — 77386: CPT

## 2017-04-07 PROCEDURE — 77386: CPT

## 2017-04-07 PROCEDURE — 77386 CHG INTENSITY MODULATED RADIATION TX DLVR COMPLEX: CPT

## 2017-04-07 PROCEDURE — 77014 CHG CT GUIDANCE RADIATION THERAPY FLDS PLACEMENT: CPT

## 2017-05-16 DIAGNOSIS — L29.9 PRURITIC CONDITION: ICD-10-CM

## 2017-05-16 RX ORDER — HYDROXYZINE HYDROCHLORIDE 10 MG/1
TABLET, FILM COATED ORAL
Qty: 30 TABLET | Refills: 0 | Status: SHIPPED | OUTPATIENT
Start: 2017-05-16 | End: 2017-08-06 | Stop reason: SDUPTHER

## 2017-05-23 ENCOUNTER — HOSPITAL ENCOUNTER (OUTPATIENT)
Dept: MRI IMAGING | Facility: HOSPITAL | Age: 57
Discharge: HOME | End: 2017-05-23
Attending: INTERNAL MEDICINE | Admitting: INTERNAL MEDICINE

## 2017-05-23 DIAGNOSIS — C31.1 PRIMARY SQUAMOUS CELL CARCINOMA OF ETHMOIDAL SINUS (HCC): ICD-10-CM

## 2017-05-23 PROCEDURE — 82565 ASSAY OF CREATININE: CPT

## 2017-05-23 PROCEDURE — A9577 INJ MULTIHANCE: HCPCS | Performed by: INTERNAL MEDICINE

## 2017-05-23 PROCEDURE — 0 GADOBENATE DIMEGLUMINE 529 MG/ML SOLUTION: Performed by: INTERNAL MEDICINE

## 2017-05-23 PROCEDURE — 70553 MRI BRAIN STEM W/O & W/DYE: CPT

## 2017-05-23 RX ADMIN — GADOBENATE DIMEGLUMINE 16 ML: 529 INJECTION, SOLUTION INTRAVENOUS at 12:06

## 2017-05-24 LAB — CREAT BLDA-MCNC: 1 MG/DL (ref 0.6–1.3)

## 2017-05-26 ENCOUNTER — LAB (OUTPATIENT)
Dept: LAB | Facility: HOSPITAL | Age: 57
End: 2017-05-26

## 2017-05-26 ENCOUNTER — OFFICE VISIT (OUTPATIENT)
Dept: ONCOLOGY | Facility: CLINIC | Age: 57
End: 2017-05-26

## 2017-05-26 VITALS
HEART RATE: 78 BPM | BODY MASS INDEX: 27.95 KG/M2 | DIASTOLIC BLOOD PRESSURE: 70 MMHG | OXYGEN SATURATION: 95 % | HEIGHT: 68 IN | SYSTOLIC BLOOD PRESSURE: 108 MMHG | WEIGHT: 184.4 LBS | RESPIRATION RATE: 18 BRPM | TEMPERATURE: 98.1 F

## 2017-05-26 DIAGNOSIS — C31.1 PRIMARY SQUAMOUS CELL CARCINOMA OF ETHMOIDAL SINUS (HCC): ICD-10-CM

## 2017-05-26 DIAGNOSIS — C31.1 PRIMARY SQUAMOUS CELL CARCINOMA OF ETHMOIDAL SINUS (HCC): Primary | ICD-10-CM

## 2017-05-26 LAB
BASOPHILS # BLD AUTO: 0.09 10*3/MM3 (ref 0–0.1)
BASOPHILS NFR BLD AUTO: 0.8 % (ref 0–1.1)
DEPRECATED RDW RBC AUTO: 43.5 FL (ref 37–49)
EOSINOPHIL # BLD AUTO: 0.18 10*3/MM3 (ref 0–0.36)
EOSINOPHIL NFR BLD AUTO: 1.6 % (ref 1–5)
ERYTHROCYTE [DISTWIDTH] IN BLOOD BY AUTOMATED COUNT: 13.3 % (ref 11.7–14.5)
HCT VFR BLD AUTO: 42.2 % (ref 40–49)
HGB BLD-MCNC: 14.6 G/DL (ref 13.5–16.5)
IMM GRANULOCYTES # BLD: 0.04 10*3/MM3 (ref 0–0.03)
IMM GRANULOCYTES NFR BLD: 0.4 % (ref 0–0.5)
LYMPHOCYTES # BLD AUTO: 1.67 10*3/MM3 (ref 1–3.5)
LYMPHOCYTES NFR BLD AUTO: 15 % (ref 20–49)
MCH RBC QN AUTO: 30.7 PG (ref 27–33)
MCHC RBC AUTO-ENTMCNC: 34.6 G/DL (ref 32–35)
MCV RBC AUTO: 88.8 FL (ref 83–97)
MONOCYTES # BLD AUTO: 0.86 10*3/MM3 (ref 0.25–0.8)
MONOCYTES NFR BLD AUTO: 7.7 % (ref 4–12)
NEUTROPHILS # BLD AUTO: 8.26 10*3/MM3 (ref 1.5–7)
NEUTROPHILS NFR BLD AUTO: 74.5 % (ref 39–75)
NRBC BLD MANUAL-RTO: 0 /100 WBC (ref 0–0)
PLATELET # BLD AUTO: 258 10*3/MM3 (ref 150–375)
PMV BLD AUTO: 9 FL (ref 8.9–12.1)
RBC # BLD AUTO: 4.75 10*6/MM3 (ref 4.3–5.5)
WBC NRBC COR # BLD: 11.1 10*3/MM3 (ref 4–10)

## 2017-05-26 PROCEDURE — 99214 OFFICE O/P EST MOD 30 MIN: CPT | Performed by: INTERNAL MEDICINE

## 2017-05-26 PROCEDURE — 85025 COMPLETE CBC W/AUTO DIFF WBC: CPT

## 2017-05-26 PROCEDURE — 36416 COLLJ CAPILLARY BLOOD SPEC: CPT

## 2017-06-30 ENCOUNTER — OFFICE VISIT (OUTPATIENT)
Dept: INTERNAL MEDICINE | Facility: CLINIC | Age: 57
End: 2017-06-30

## 2017-06-30 VITALS
HEIGHT: 68 IN | DIASTOLIC BLOOD PRESSURE: 62 MMHG | WEIGHT: 182 LBS | SYSTOLIC BLOOD PRESSURE: 104 MMHG | BODY MASS INDEX: 27.58 KG/M2 | TEMPERATURE: 98.1 F

## 2017-06-30 DIAGNOSIS — J06.9 ACUTE URI: Primary | ICD-10-CM

## 2017-06-30 PROCEDURE — 99213 OFFICE O/P EST LOW 20 MIN: CPT | Performed by: INTERNAL MEDICINE

## 2017-06-30 RX ORDER — AMOXICILLIN 875 MG/1
875 TABLET, COATED ORAL 2 TIMES DAILY
Qty: 14 TABLET | Refills: 0 | Status: SHIPPED | OUTPATIENT
Start: 2017-06-30 | End: 2017-07-07

## 2017-06-30 NOTE — PROGRESS NOTES
"Chief Complaint   Patient presents with   • Cough     congestion        Subjective   Irving Albarran is a 56 y.o. male     HPI:   He is having problems with respiratory symptoms:   His symptoms began in the last day or 2.  They have started rather gradually.  Other family members are ill with similar symptoms.  So far, he has not had any worsening of the symptoms since they began.    His symptoms include: Nonproductive cough, head congestion, nasal drainage.  He does not have an earache, fever, shortness of breath, sinus pain, sore throat, wheezing.  He has not had loss of appetite.    The following portions of the patient's history were reviewed and updated as appropriate: allergies, current medications, past social history, problem list, past surgical history    Review of Systems   Constitutional: Negative for appetite change and fever.   Respiratory: Positive for cough. Negative for shortness of breath and wheezing.    Cardiovascular: Negative for chest pain.   Gastrointestinal: Negative for nausea and vomiting.   Neurological: Positive for headaches (not unusual).           Objective     /62  Temp 98.1 °F (36.7 °C)  Ht 68\" (172.7 cm)  Wt 182 lb (82.6 kg)  BMI 27.67 kg/m2     Physical Exam   Constitutional: He appears well-developed and well-nourished. No distress.   HENT:   Right Ear: Tympanic membrane and ear canal normal.   Left Ear: Tympanic membrane and ear canal normal.   Nose: Right sinus exhibits no maxillary sinus tenderness and no frontal sinus tenderness. Left sinus exhibits no maxillary sinus tenderness and no frontal sinus tenderness.   Mouth/Throat: Oropharynx is clear and moist. No oropharyngeal exudate or posterior oropharyngeal edema.   Eyes: Conjunctivae are normal.   Neck: Neck supple. Normal carotid pulses present. Carotid bruit is not present.   Cardiovascular: Normal rate, regular rhythm, S1 normal, S2 normal and intact distal pulses.  Exam reveals no gallop and no friction rub.  "   No murmur heard.  Pulses:       Carotid pulses are 2+ on the right side, and 2+ on the left side.  Pulmonary/Chest: Effort normal and breath sounds normal. No respiratory distress. He has no wheezes. He has no rhonchi. He has no rales. Chest wall is not dull to percussion.   Dry cough is present.   Musculoskeletal: He exhibits no edema.   Lymphadenopathy:     He has no cervical adenopathy.   Neurological: He is alert.   Skin: Skin is warm and dry.   Nursing note and vitals reviewed.      Assessment/Plan   Problem List Items Addressed This Visit     None      Visit Diagnoses     Acute URI    -  Primary    Relevant Medications    amoxicillin (AMOXIL) 875 MG tablet

## 2017-08-06 DIAGNOSIS — L29.9 PRURITIC CONDITION: ICD-10-CM

## 2017-08-07 RX ORDER — HYDROXYZINE HYDROCHLORIDE 10 MG/1
TABLET, FILM COATED ORAL
Qty: 30 TABLET | Refills: 3 | Status: SHIPPED | OUTPATIENT
Start: 2017-08-07

## 2017-08-21 ENCOUNTER — HOSPITAL ENCOUNTER (OUTPATIENT)
Dept: MRI IMAGING | Facility: HOSPITAL | Age: 57
Discharge: HOME | End: 2017-08-21
Attending: INTERNAL MEDICINE | Admitting: INTERNAL MEDICINE

## 2017-08-21 DIAGNOSIS — C31.1 PRIMARY SQUAMOUS CELL CARCINOMA OF ETHMOIDAL SINUS (HCC): ICD-10-CM

## 2017-08-21 PROCEDURE — 0 GADOBENATE DIMEGLUMINE 529 MG/ML SOLUTION: Performed by: INTERNAL MEDICINE

## 2017-08-21 PROCEDURE — 70553 MRI BRAIN STEM W/O & W/DYE: CPT

## 2017-08-21 PROCEDURE — A9577 INJ MULTIHANCE: HCPCS | Performed by: INTERNAL MEDICINE

## 2017-08-21 RX ADMIN — GADOBENATE DIMEGLUMINE 20 ML: 529 INJECTION, SOLUTION INTRAVENOUS at 11:13

## 2017-08-28 ENCOUNTER — LAB (OUTPATIENT)
Dept: LAB | Facility: HOSPITAL | Age: 57
End: 2017-08-28
Attending: INTERNAL MEDICINE

## 2017-08-28 ENCOUNTER — OFFICE VISIT (OUTPATIENT)
Dept: ONCOLOGY | Facility: CLINIC | Age: 57
End: 2017-08-28

## 2017-08-28 VITALS
TEMPERATURE: 97.8 F | DIASTOLIC BLOOD PRESSURE: 82 MMHG | BODY MASS INDEX: 28.31 KG/M2 | RESPIRATION RATE: 12 BRPM | WEIGHT: 186.8 LBS | HEIGHT: 68 IN | SYSTOLIC BLOOD PRESSURE: 110 MMHG | OXYGEN SATURATION: 98 % | HEART RATE: 57 BPM

## 2017-08-28 DIAGNOSIS — C80.1 CARCINOMA (HCC): Primary | ICD-10-CM

## 2017-08-28 DIAGNOSIS — C31.1 PRIMARY SQUAMOUS CELL CARCINOMA OF ETHMOIDAL SINUS (HCC): Primary | ICD-10-CM

## 2017-08-28 DIAGNOSIS — C31.1 PRIMARY SQUAMOUS CELL CARCINOMA OF ETHMOIDAL SINUS (HCC): ICD-10-CM

## 2017-08-28 DIAGNOSIS — Q02 MICROCEPHALY (HCC): ICD-10-CM

## 2017-08-28 LAB
ALBUMIN SERPL-MCNC: 4 G/DL (ref 3.5–5.2)
ALBUMIN/GLOB SERPL: 1.3 G/DL (ref 1.1–2.4)
ALP SERPL-CCNC: 84 U/L (ref 38–116)
ALT SERPL W P-5'-P-CCNC: 17 U/L (ref 0–41)
ANION GAP SERPL CALCULATED.3IONS-SCNC: 12.2 MMOL/L
AST SERPL-CCNC: 15 U/L (ref 0–40)
BASOPHILS # BLD AUTO: 0.08 10*3/MM3 (ref 0–0.1)
BASOPHILS NFR BLD AUTO: 1.2 % (ref 0–1.1)
BILIRUB SERPL-MCNC: 0.4 MG/DL (ref 0.1–1.2)
BUN BLD-MCNC: 25 MG/DL (ref 6–20)
BUN/CREAT SERPL: 25.3 (ref 7.3–30)
CALCIUM SPEC-SCNC: 9.2 MG/DL (ref 8.5–10.2)
CHLORIDE SERPL-SCNC: 101 MMOL/L (ref 98–107)
CO2 SERPL-SCNC: 26.8 MMOL/L (ref 22–29)
CREAT BLD-MCNC: 0.99 MG/DL (ref 0.7–1.3)
DEPRECATED RDW RBC AUTO: 43.4 FL (ref 37–49)
EOSINOPHIL # BLD AUTO: 0.19 10*3/MM3 (ref 0–0.36)
EOSINOPHIL NFR BLD AUTO: 2.8 % (ref 1–5)
ERYTHROCYTE [DISTWIDTH] IN BLOOD BY AUTOMATED COUNT: 12.6 % (ref 11.7–14.5)
GFR SERPL CREATININE-BSD FRML MDRD: 78 ML/MIN/1.73
GLOBULIN UR ELPH-MCNC: 3 GM/DL (ref 1.8–3.5)
GLUCOSE BLD-MCNC: 95 MG/DL (ref 74–124)
HCT VFR BLD AUTO: 43.6 % (ref 40–49)
HGB BLD-MCNC: 14.3 G/DL (ref 13.5–16.5)
HOLD SPECIMEN: NORMAL
IMM GRANULOCYTES # BLD: 0.02 10*3/MM3 (ref 0–0.03)
IMM GRANULOCYTES NFR BLD: 0.3 % (ref 0–0.5)
LYMPHOCYTES # BLD AUTO: 1.62 10*3/MM3 (ref 1–3.5)
LYMPHOCYTES NFR BLD AUTO: 23.8 % (ref 20–49)
MCH RBC QN AUTO: 31 PG (ref 27–33)
MCHC RBC AUTO-ENTMCNC: 32.8 G/DL (ref 32–35)
MCV RBC AUTO: 94.4 FL (ref 83–97)
MONOCYTES # BLD AUTO: 0.58 10*3/MM3 (ref 0.25–0.8)
MONOCYTES NFR BLD AUTO: 8.5 % (ref 4–12)
NEUTROPHILS # BLD AUTO: 4.31 10*3/MM3 (ref 1.5–7)
NEUTROPHILS NFR BLD AUTO: 63.4 % (ref 39–75)
NRBC BLD MANUAL-RTO: 0 /100 WBC (ref 0–0)
PLATELET # BLD AUTO: 247 10*3/MM3 (ref 150–375)
PMV BLD AUTO: 9.1 FL (ref 8.9–12.1)
POTASSIUM BLD-SCNC: 4.4 MMOL/L (ref 3.5–4.7)
PROT SERPL-MCNC: 7 G/DL (ref 6.3–8)
RBC # BLD AUTO: 4.62 10*6/MM3 (ref 4.3–5.5)
SODIUM BLD-SCNC: 140 MMOL/L (ref 134–145)
WBC NRBC COR # BLD: 6.8 10*3/MM3 (ref 4–10)

## 2017-08-28 PROCEDURE — 80053 COMPREHEN METABOLIC PANEL: CPT | Performed by: INTERNAL MEDICINE

## 2017-08-28 PROCEDURE — 99214 OFFICE O/P EST MOD 30 MIN: CPT | Performed by: INTERNAL MEDICINE

## 2017-08-28 PROCEDURE — 36415 COLL VENOUS BLD VENIPUNCTURE: CPT | Performed by: INTERNAL MEDICINE

## 2017-08-28 PROCEDURE — 85025 COMPLETE CBC W/AUTO DIFF WBC: CPT | Performed by: INTERNAL MEDICINE

## 2017-08-28 NOTE — PROGRESS NOTES
[facility] OUTPATIENT FOLLOW UP CLINIC VISIT    REASON FOR FOLLOW-UP:      1. 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   2. Status post resection on 09/17/2012 at [facility].   3. Adjuvant concurrent chemoradiation initiated on 10/25/2012.   4. Sixth and final cycle of weekly carboplatin/paclitaxel administered on 11/29/2012.   5. Last radiation on 12/03/2012.   6. Hospitalized from 12/06/2012 to 12/18/2012 with falls. 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   7. Followed by Dr. Cassidy, Neurosurgery at [facility] and MRI performed at [facility] showed a 2.5 cm mass in the right frontal lobe. The patient underwent right frontal craniotomy for resection of the tumor. It was a cystic right frontal tumor. Dr. Cassidy was able to remove it en bloc on 7/20/2015. The pathology was consistent with a non-small cell carcinoma, high grade. It was a 2.3 x 2.3 x 0.6 tumor consistent with high grade non-small cell carcinoma consistent with previous primary.  8. He became less interactive with urinary incontinence, and intracranial recurrence was again noted with repeat right frontal craniotomy at  on 11/15/2016.  The operative note indicates complete removal of the tumor.      9. PET scan and MRI of the brain on 12/14/2016 and 12/18/2016 respectively show no metastatic disease and no definite evidence of residual intracranial malignancy.  Dr. Tran does not plan radiation therapy.  Observation pursued.  [facility] NGS assessment requested.  10. 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  PD-L1 testing pending as of 2/21/2017.  11. MRI imaging on 2/14/2017 shows evidence of recurrence in total measuring 19 x 16 x 15 mm.  He completed radiation by Dr. Tran.  12. MRI imaging on 5/23/2017 with significant  "improvement.  13. MRI imaging on 8/21/2017 with stable findings.      HISTORY OF PRESENT ILLNESS:  Irving Albarran is a 56 y.o. male who returns today for follow up of the above issue to review MRI imaging.  He is doing well.  He does state that yesterday he had epistaxis from the right nare.  Otherwise no new problems at this point.      PAST MEDICAL, SURGICAL, FAMILY, AND SOCIAL HISTORIES were reviewed with the patient and in the electronic medical record, and were updated if indicated.    ALLERGIES:  Allergies   Allergen Reactions   • Morphine And Related        MEDICATIONS:  The medication list has been reviewed with the patient by the medical assistant, and the list has been updated in the electronic medical record, which I reviewed.  Medication dosages and frequencies were confirmed to be accurate.    REVIEW OF SYSTEMS:  PAIN:  See Vital Signs below.  GENERAL:  No fevers, chills, night sweats, or unintended weight loss.    SKIN:  No rash  HEME/LYMPH:  No abnormal bleeding.  No palpable lymphadenopathy.  EYES:  No vision changes or diplopia.  Some tearing of the left eye.  ENT:  No sore throat or difficulty swallowing.See history of present illness  RESPIRATORY:  No cough, shortness of breath, hemoptysis, or wheezing.  Occasional tachypnea which is a behavioral issue.  CARDIOVASCULAR:  No chest pain, palpitations, orthopnea, or dyspnea on exertion.  GASTROINTESTINAL:  No abdominal pain, nausea, vomiting, constipation, diarrhea, melena, or hematochezia.  GENITOURINARY:  No dysuria or hematuria.  MUSCULOSKELETAL:  No joint pain, swelling, or erythema.  NEUROLOGIC:  No dizziness, loss of consciousness, or seizures.  PSYCHIATRIC:  No mood changes    Vitals:    08/28/17 1035   BP: 110/82   Pulse: 57   Resp: 12   Temp: 97.8 °F (36.6 °C)   TempSrc: Oral   SpO2: 98%   Weight: 186 lb 12.8 oz (84.7 kg)   Height: 68.31\" (173.5 cm)  Comment: new height   PainSc: 0-No pain       PHYSICAL EXAMINATION:  GENERAL:  " Well-developed well-nourished male; awake, alert and oriented, in no acute distress.  SKIN:  No rash.  HEAD:  Microcephaly is noted.  Healed surgical incision at the frontal hairline.  Dimple between his eyes at the top of his nose.  EYES:  Pupils equal, round and reactive to light.  Extraocular movements intact.  Conjunctivae normal.  EARS:  Hearing intact.  NOSE:  Septum midline.  Crusted blood is present in the right naris  MOUTH:  No stomatitis or ulcers.  Lips are normal.  THROAT:  Oropharynx without lesions or exudates.  NECK:  Supple with good range of motion; no thyromegaly or masses; no JVD or bruits.  LYMPHATICS:  No cervical, supraclavicular, axillary, or inguinal lymphadenopathy.  CHEST:  Lungs are clear to auscultation bilaterally.  No wheezes, rales, or rhonchi.  HEART:  Regular rate; normal rhythm.  No murmurs, gallops or rubs.  ABDOMEN:  Soft, non-tender, non-distended.  Normal active bowel sounds.  No organomegaly.  EXTREMITIES:  No clubbing, cyanosis, or edema.  NEUROLOGICAL:  No focal neurologic deficits.    DIAGNOSTIC DATA:  Lab Results   Component Value Date    WBC 6.80 08/28/2017    HGB 14.3 08/28/2017    HCT 43.6 08/28/2017    MCV 94.4 08/28/2017     08/28/2017       IMAGING:  MRI brain imaging from 8/21//2017 personally reviewed.  Significant improvement with essential resolution of previously noted suspected malignant abnormalities.    ASSESSMENT:  This is a 56 y.o. male with:   1.  History of stage IV poorly differentiated carcinoma of the left ethmoid sinus, status post surgery followed by chemoradiation.  2.  Frontal lobe metastatic lesion, resected, consistent with his previous tumor.  Second recurrence also now resected on 11/15/2016.  We initially observed.  There was evidence of local recurrence and he received further radiation.  MRI imaging showed significant improvement and now stabilization.  We continue intermittent monitoring.    PLAN:  1.  Return in about 3 months with  labs and an MRI of his brain with and without contrast done prior to that.

## 2017-10-13 ENCOUNTER — TELEPHONE (OUTPATIENT)
Dept: ONCOLOGY | Facility: HOSPITAL | Age: 57
End: 2017-10-13

## 2017-10-13 NOTE — TELEPHONE ENCOUNTER
Spoke with sister in law. She will check with derm and let us know if she finds out any other information, but as far as she knows it is skin cancer.     ----- Message from Adalid Gonzalez MD sent at 10/13/2017  2:28 PM EDT -----  OK thanks.  As long as they think it's a skin cancer and not related to his sinus tumor we're OK.  And it's his sister not his wife..he's not .  Thanks!  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 Patient is having Mohs surgery on 10/23 to have it removed. Wanted to make sure you were aware and to let them know if we have anything to add from our standpoint.

## 2017-10-13 NOTE — TELEPHONE ENCOUNTER
pts sister in law Shireen called to give info to MD. Called back, no answer. Left v/m for her to call back.

## 2017-10-13 NOTE — TELEPHONE ENCOUNTER
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 Patient is having Mohs surgery on 10/23 to have it removed. Wanted to make sure you were aware and to let them know if we have anything to add from our standpoint.

## 2017-11-27 ENCOUNTER — HOSPITAL ENCOUNTER (OUTPATIENT)
Dept: MRI IMAGING | Facility: HOSPITAL | Age: 57
Discharge: HOME | End: 2017-11-27
Attending: INTERNAL MEDICINE | Admitting: INTERNAL MEDICINE

## 2017-11-27 DIAGNOSIS — C31.1 PRIMARY SQUAMOUS CELL CARCINOMA OF ETHMOIDAL SINUS (HCC): ICD-10-CM

## 2017-11-27 PROCEDURE — 82565 ASSAY OF CREATININE: CPT

## 2017-11-27 PROCEDURE — 70553 MRI BRAIN STEM W/O & W/DYE: CPT

## 2017-11-27 PROCEDURE — A9577 INJ MULTIHANCE: HCPCS | Performed by: INTERNAL MEDICINE

## 2017-11-27 PROCEDURE — 0 GADOBENATE DIMEGLUMINE 529 MG/ML SOLUTION: Performed by: INTERNAL MEDICINE

## 2017-11-27 RX ADMIN — GADOBENATE DIMEGLUMINE 17 ML: 529 INJECTION, SOLUTION INTRAVENOUS at 13:40

## 2017-11-28 LAB — CREAT BLDA-MCNC: 1.2 MG/DL (ref 0.6–1.3)

## 2017-12-05 ENCOUNTER — OFFICE VISIT (OUTPATIENT)
Dept: ONCOLOGY | Facility: CLINIC | Age: 57
End: 2017-12-05

## 2017-12-05 ENCOUNTER — LAB (OUTPATIENT)
Dept: LAB | Facility: HOSPITAL | Age: 57
End: 2017-12-05

## 2017-12-05 VITALS
HEIGHT: 69 IN | SYSTOLIC BLOOD PRESSURE: 126 MMHG | DIASTOLIC BLOOD PRESSURE: 84 MMHG | TEMPERATURE: 97.4 F | HEART RATE: 60 BPM | BODY MASS INDEX: 27.64 KG/M2 | RESPIRATION RATE: 18 BRPM | WEIGHT: 186.6 LBS | OXYGEN SATURATION: 100 %

## 2017-12-05 DIAGNOSIS — C31.1 PRIMARY SQUAMOUS CELL CARCINOMA OF ETHMOIDAL SINUS (HCC): Primary | ICD-10-CM

## 2017-12-05 DIAGNOSIS — C31.1 PRIMARY SQUAMOUS CELL CARCINOMA OF ETHMOIDAL SINUS (HCC): ICD-10-CM

## 2017-12-05 DIAGNOSIS — C80.1 CARCINOMA (HCC): Primary | ICD-10-CM

## 2017-12-05 LAB
ALBUMIN SERPL-MCNC: 4.1 G/DL (ref 3.5–5.2)
ALBUMIN/GLOB SERPL: 1.3 G/DL (ref 1.1–2.4)
ALP SERPL-CCNC: 90 U/L (ref 38–116)
ALT SERPL W P-5'-P-CCNC: 17 U/L (ref 0–41)
ANION GAP SERPL CALCULATED.3IONS-SCNC: 11.2 MMOL/L
AST SERPL-CCNC: 17 U/L (ref 0–40)
BASOPHILS # BLD AUTO: 0.06 10*3/MM3 (ref 0–0.1)
BASOPHILS NFR BLD AUTO: 1 % (ref 0–1.1)
BILIRUB SERPL-MCNC: 0.4 MG/DL (ref 0.1–1.2)
BUN BLD-MCNC: 22 MG/DL (ref 6–20)
BUN/CREAT SERPL: 21.6 (ref 7.3–30)
CALCIUM SPEC-SCNC: 9.4 MG/DL (ref 8.5–10.2)
CHLORIDE SERPL-SCNC: 103 MMOL/L (ref 98–107)
CO2 SERPL-SCNC: 26.8 MMOL/L (ref 22–29)
CREAT BLD-MCNC: 1.02 MG/DL (ref 0.7–1.3)
DEPRECATED RDW RBC AUTO: 44.1 FL (ref 37–49)
EOSINOPHIL # BLD AUTO: 0.15 10*3/MM3 (ref 0–0.36)
EOSINOPHIL NFR BLD AUTO: 2.4 % (ref 1–5)
ERYTHROCYTE [DISTWIDTH] IN BLOOD BY AUTOMATED COUNT: 12.6 % (ref 11.7–14.5)
GFR SERPL CREATININE-BSD FRML MDRD: 75 ML/MIN/1.73
GLOBULIN UR ELPH-MCNC: 3.2 GM/DL (ref 1.8–3.5)
GLUCOSE BLD-MCNC: 96 MG/DL (ref 74–124)
HCT VFR BLD AUTO: 45.8 % (ref 40–49)
HGB BLD-MCNC: 15.1 G/DL (ref 13.5–16.5)
HOLD SPECIMEN: NORMAL
IMM GRANULOCYTES # BLD: 0.03 10*3/MM3 (ref 0–0.03)
IMM GRANULOCYTES NFR BLD: 0.5 % (ref 0–0.5)
LYMPHOCYTES # BLD AUTO: 1.58 10*3/MM3 (ref 1–3.5)
LYMPHOCYTES NFR BLD AUTO: 25.6 % (ref 20–49)
MCH RBC QN AUTO: 31.3 PG (ref 27–33)
MCHC RBC AUTO-ENTMCNC: 33 G/DL (ref 32–35)
MCV RBC AUTO: 95 FL (ref 83–97)
MONOCYTES # BLD AUTO: 0.44 10*3/MM3 (ref 0.25–0.8)
MONOCYTES NFR BLD AUTO: 7.1 % (ref 4–12)
NEUTROPHILS # BLD AUTO: 3.9 10*3/MM3 (ref 1.5–7)
NEUTROPHILS NFR BLD AUTO: 63.4 % (ref 39–75)
NRBC BLD MANUAL-RTO: 0 /100 WBC (ref 0–0)
PLATELET # BLD AUTO: 251 10*3/MM3 (ref 150–375)
PMV BLD AUTO: 9 FL (ref 8.9–12.1)
POTASSIUM BLD-SCNC: 4.4 MMOL/L (ref 3.5–4.7)
PROT SERPL-MCNC: 7.3 G/DL (ref 6.3–8)
RBC # BLD AUTO: 4.82 10*6/MM3 (ref 4.3–5.5)
SODIUM BLD-SCNC: 141 MMOL/L (ref 134–145)
WBC NRBC COR # BLD: 6.16 10*3/MM3 (ref 4–10)

## 2017-12-05 PROCEDURE — 36415 COLL VENOUS BLD VENIPUNCTURE: CPT | Performed by: INTERNAL MEDICINE

## 2017-12-05 PROCEDURE — 85025 COMPLETE CBC W/AUTO DIFF WBC: CPT | Performed by: INTERNAL MEDICINE

## 2017-12-05 PROCEDURE — 99215 OFFICE O/P EST HI 40 MIN: CPT | Performed by: INTERNAL MEDICINE

## 2017-12-05 PROCEDURE — 80053 COMPREHEN METABOLIC PANEL: CPT | Performed by: INTERNAL MEDICINE

## 2017-12-05 NOTE — PROGRESS NOTES
[facility] OUTPATIENT FOLLOW UP CLINIC VISIT    REASON FOR FOLLOW-UP:      1. 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   2. Status post resection on 09/17/2012 at [facility].   3. Adjuvant concurrent chemoradiation initiated on 10/25/2012.   4. Sixth and final cycle of weekly carboplatin/paclitaxel administered on 11/29/2012.   5. Last radiation on 12/03/2012.   6. Hospitalized from 12/06/2012 to 12/18/2012 with falls. 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   7. Followed by Dr. Cassidy, Neurosurgery at [facility] and MRI performed at [facility] showed a 2.5 cm mass in the right frontal lobe. The patient underwent right frontal craniotomy for resection of the tumor. It was a cystic right frontal tumor. Dr. Cassidy was able to remove it en bloc on 7/20/2015. The pathology was consistent with a non-small cell carcinoma, high grade. It was a 2.3 x 2.3 x 0.6 tumor consistent with high grade non-small cell carcinoma consistent with previous primary.  8. He became less interactive with urinary incontinence, and intracranial recurrence was again noted with repeat right frontal craniotomy at  on 11/15/2016.  The operative note indicates complete removal of the tumor.      9. PET scan and MRI of the brain on 12/14/2016 and 12/18/2016 respectively show no metastatic disease and no definite evidence of residual intracranial malignancy.  Dr. Tran does not plan radiation therapy.  Observation pursued.  [facility] NGS assessment requested.  10. 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  PD-L1 testing pending as of 2/21/2017.  11. MRI imaging on 2/14/2017 shows evidence of recurrence in total measuring 19 x 16 x 15 mm.  He completed radiation by Dr. Tran.  12. MRI imaging on 5/23/2017 with significant  improvement.  13. MRI imaging on 8/21/2017 with stable findings.  14. Stable areas of encephalomalacia.  There is a new nodular area in the right orbital frontal gyrus measuring approximately 5 mm as well as in the right inferior frontal gyrus measuring up to 1.1 cm suspicious for recurrent metastatic disease.      HISTORY OF PRESENT ILLNESS:  Irving Albarran is a 57 y.o. male who returns today for follow up of the above issue to review MRI imaging.  He continues to do well.  Occasionally he has some tingling on the left side of his head.  He did not complain of significant headaches today.  No behavior changes.      PAST MEDICAL, SURGICAL, FAMILY, AND SOCIAL HISTORIES were reviewed with the patient and in the electronic medical record, and were updated if indicated.    ALLERGIES:  Allergies   Allergen Reactions   • Morphine And Related        MEDICATIONS:  The medication list has been reviewed with the patient by the medical assistant, and the list has been updated in the electronic medical record, which I reviewed.  Medication dosages and frequencies were confirmed to be accurate.    REVIEW OF SYSTEMS:  PAIN:  See Vital Signs below.  GENERAL:  No fevers, chills, night sweats, or unintended weight loss.    SKIN:  No rash  HEME/LYMPH:  No abnormal bleeding.  No palpable lymphadenopathy.  EYES:  No vision changes or diplopia.  Some tearing of the left eye.  ENT:  No sore throat or difficulty swallowing.See history of present illness  RESPIRATORY:  No cough, shortness of breath, hemoptysis, or wheezing.  Occasional tachypnea which is a behavioral issue.  CARDIOVASCULAR:  No chest pain, palpitations, orthopnea, or dyspnea on exertion.  GASTROINTESTINAL:  No abdominal pain, nausea, vomiting, constipation, diarrhea, melena, or hematochezia.  GENITOURINARY:  No dysuria or hematuria.  MUSCULOSKELETAL:  No joint pain, swelling, or erythema.  NEUROLOGIC:  No dizziness, loss of consciousness, or seizures.  PSYCHIATRIC:  No  "mood changes    Vitals:    12/05/17 1046   Height: 175.3 cm (69.02\")       PHYSICAL EXAMINATION:  GENERAL:  Well-developed well-nourished male; awake, alert and oriented, in no acute distress.  SKIN:  No rash.  HEAD:  Microcephaly is noted.  Healed surgical incision at the frontal hairline.  Dimple between his eyes at the top of his nose.  EYES:  Pupils equal, round and reactive to light.  Extraocular movements intact.  Conjunctivae normal.  EARS:  Hearing intact.  NOSE:  Septum midline.  Crusted blood is present in the right naris  MOUTH:  No stomatitis or ulcers.  Lips are normal.  THROAT:  Oropharynx without lesions or exudates.  NECK:  Supple with good range of motion; no thyromegaly or masses; no JVD or bruits.  LYMPHATICS:  No cervical, supraclavicular, axillary, or inguinal lymphadenopathy.  CHEST:  Lungs are clear to auscultation bilaterally.  No wheezes, rales, or rhonchi.  HEART:  Regular rate; normal rhythm.  No murmurs, gallops or rubs.  ABDOMEN:  Soft, non-tender, non-distended.  Normal active bowel sounds.  No organomegaly.  EXTREMITIES:  No clubbing, cyanosis, or edema.  NEUROLOGICAL:  No focal neurologic deficits.    DIAGNOSTIC DATA:  Lab Results   Component Value Date    WBC 6.16 12/05/2017    HGB 15.1 12/05/2017    HCT 45.8 12/05/2017    MCV 95.0 12/05/2017     12/05/2017       IMAGING:  MRI brain imaging from 11/27/2017 personally reviewed.  2 small areas of concern likely representing recurrent malignancy.    IMPRESSION:      In the interval since the previous study, the patient has developed 2  foci of abnormal contrast enhancing tissue within the right frontal  lobe. These are noted within the right orbital frontal gyrus and the  right inferior frontal gyrus as discussed in detail above. The findings  are most likely representative of new foci of metastatic disease.      The site of the resection of the ethmoid sinus tumor is stable in  appearance and there is no convincing evidence to " suggest recurrent  disease at this site. However, of course, continued follow-up is suggested.      Postoperative changes as well as bifrontal areas of encephalomalacia are  again noted. Proteinaceous contents are incidentally appreciated within  the left aspect of the sphenoid sinus.      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    ASSESSMENT:  This is a 57 y.o. male with:   1.  History of stage IV poorly differentiated carcinoma of the left ethmoid sinus, status post surgery followed by chemoradiation.  2.  Frontal lobe metastatic lesion, resected, consistent with his previous tumor.  Second recurrence also now resected on 11/15/2016.  We initially observed.  There was evidence of local recurrence and he received further radiation.  MRI imaging showed significant improvement and stabilization.  MRI imaging is now concerning for 2 new lesions that likely represent progressive malignancy.  These are quite small.  I did communicate with Dr. Pandya regarding these.  Further radiation may be possible.  His sister will communicate with Dr. Cassidy his surgeon at the [facility] to see if further resection would be appropriate.  I would prefer local therapy before committing him to systemic therapy at this point.  Family will discuss the situation and I'll discuss the situation further with his sister.  Erbitux could be considered, or Opdivo or Keytruda.      PLAN:  1.  I'll communicate with Dr. Cassidy at  and then communicate further with Dr. Pandya with radiation oncology if appropriate.  2.  If there are no further local therapy options we will consider therapy with Erbitux or potentially Opdivo or Keytruda if the family desires further therapy.  Otherwise his intracranial tumors in the past of grown reasonably quickly and I estimate that he  might have around 6 months left if we do nothing.    Custom the situation at length with his sister today.  We will arrange follow-up as appropriate depending on what is decided in the near future regarding treatment.

## 2018-06-29 ENCOUNTER — OFFICE VISIT (OUTPATIENT)
Dept: INTERNAL MEDICINE | Facility: CLINIC | Age: 58
End: 2018-06-29

## 2018-06-29 VITALS
SYSTOLIC BLOOD PRESSURE: 92 MMHG | BODY MASS INDEX: 26.22 KG/M2 | HEIGHT: 68 IN | DIASTOLIC BLOOD PRESSURE: 58 MMHG | HEART RATE: 78 BPM | OXYGEN SATURATION: 98 % | WEIGHT: 173 LBS

## 2018-06-29 DIAGNOSIS — T78.3XXA ANGIOEDEMA, INITIAL ENCOUNTER: Primary | ICD-10-CM

## 2018-06-29 PROCEDURE — 99213 OFFICE O/P EST LOW 20 MIN: CPT | Performed by: INTERNAL MEDICINE

## 2018-06-29 RX ORDER — METHYLPREDNISOLONE 4 MG/1
TABLET ORAL
Qty: 21 TABLET | Refills: 0 | Status: SHIPPED | OUTPATIENT
Start: 2018-06-29

## 2018-06-29 NOTE — PROGRESS NOTES
Subjective   Irving Albarran is a 57 y.o. male.     Here for Acute Clinic Developing facial swelling since yesterday         The following portions of the patient's history were reviewed and updated as appropriate: allergies, current medications, past family history, past medical history, past social history, past surgical history and problem list.    Review of Systems   Constitutional:        Here for facial swelling   HENT: Positive for facial swelling (Face itches & is swelling).         Has had RX for squam,ous Ca of ethmoid sinus Has done well Still seeing oncology every 6 months   Eyes: Negative.    Respiratory: Negative.    Cardiovascular: Negative.    Gastrointestinal: Negative.    Genitourinary: Negative.        Objective   Physical Exam   Constitutional: He appears well-developed and well-nourished.   HENT:   Head: Normocephalic.   Right Ear: External ear normal.   Left Ear: External ear normal.   Mouth/Throat: Oropharynx is clear and moist.   Eyes:   Has bilat periorbital edema  R> L    Cardiovascular: Normal rate, regular rhythm and normal heart sounds.    Repeat 90/60   Pulmonary/Chest: Effort normal and breath sounds normal.   Musculoskeletal: Normal range of motion.   Neurological: He is alert.   Skin:   Has scabbed crusted area above bridge of nose from prior OR that he keeps picking       Assessment/Plan   Irving was seen today for eye problem.    Diagnoses and all orders for this visit:    Angioedema, initial encounter  -     MethylPREDNISolone (MEDROLLAURYN,) 4 MG tablet; Take as directed on package instructions.

## 2018-11-27 NOTE — PROGRESS NOTES
3/10/2017    Irving Albarran is a 56 y.o. male    There were no vitals filed for this visit.    Weekly Management:  Allergies reviewed?   Yes  Problemlist reviewed?   Yes  Medication reviewed?   Yes   New medications given? no  Problem added?   no  Issues raised requiring referral to support services:no    Technical aspects reviewed:  Weekly port films approved?   Yes  Weekly OBI imaging reviewed? Yes  Patient setup and plan reviewed?   Yes  Change requests noted on port film?   no    Chart Reviewed:  Treatment plan change requested?   no  Continue current treatment plan?   Yes  Concurrent Chemo?  no  CBC reviewed?   no    Toxicities:   None     Performance Status: 90 - Able to carry on normal activity; minor signs or symptoms of disease     Reason for Visit: No chief complaint on file.  Seen before 5 / 25    Notes on Treatment course, Films, Medical progress: No problems with XRT and no complaints (he never complains about anything). See with sister-in-law Raiza who is in charge of his care. Yesterday I again discussed him with Palmira Gonzalez and Liyah to make sure they did not wish to give any systemic treatmet since all I can give is 5000 cGy. Both said no. I informed Raiza of that conversation. Today he had his first visit with Dr. Alonso who is now his new PCP.         Seen and approved by:  Mauirzio Tran MD  03/10/2017   Information: Selecting Yes will display possible errors in your note based on the variables you have selected. This validation is only offered as a suggestion for you. PLEASE NOTE THAT THE VALIDATION TEXT WILL BE REMOVED WHEN YOU FINALIZE YOUR NOTE. IF YOU WANT TO FAX A PRELIMINARY NOTE YOU WILL NEED TO TOGGLE THIS TO 'NO' IF YOU DO NOT WANT IT IN YOUR FAXED NOTE.

## 2019-01-01 ENCOUNTER — TELEPHONE (OUTPATIENT)
Dept: ONCOLOGY | Facility: CLINIC | Age: 59
End: 2019-01-01

## 2019-01-01 ENCOUNTER — HOSPITAL ENCOUNTER (EMERGENCY)
Facility: HOSPITAL | Age: 59
Discharge: ANOTHER HOSPITAL | End: 2019-04-22
Attending: EMERGENCY MEDICINE | Admitting: EMERGENCY MEDICINE

## 2019-01-01 ENCOUNTER — APPOINTMENT (OUTPATIENT)
Dept: OTHER | Facility: HOSPITAL | Age: 59
End: 2019-01-01

## 2019-01-01 ENCOUNTER — OFFICE VISIT (OUTPATIENT)
Dept: ONCOLOGY | Facility: CLINIC | Age: 59
End: 2019-01-01

## 2019-01-01 ENCOUNTER — APPOINTMENT (OUTPATIENT)
Dept: CT IMAGING | Facility: HOSPITAL | Age: 59
End: 2019-01-01

## 2019-01-01 VITALS
WEIGHT: 177 LBS | HEART RATE: 95 BPM | RESPIRATION RATE: 18 BRPM | HEIGHT: 68 IN | SYSTOLIC BLOOD PRESSURE: 153 MMHG | DIASTOLIC BLOOD PRESSURE: 91 MMHG | BODY MASS INDEX: 26.83 KG/M2 | OXYGEN SATURATION: 98 % | TEMPERATURE: 97.9 F

## 2019-01-01 VITALS
HEIGHT: 68 IN | OXYGEN SATURATION: 97 % | SYSTOLIC BLOOD PRESSURE: 117 MMHG | WEIGHT: 170.6 LBS | DIASTOLIC BLOOD PRESSURE: 76 MMHG | BODY MASS INDEX: 25.85 KG/M2 | HEART RATE: 69 BPM | TEMPERATURE: 97.6 F | RESPIRATION RATE: 16 BRPM

## 2019-01-01 DIAGNOSIS — C31.1 PRIMARY SQUAMOUS CELL CARCINOMA OF ETHMOIDAL SINUS (HCC): Primary | ICD-10-CM

## 2019-01-01 DIAGNOSIS — G04.90 ENCEPHALITIS: Primary | ICD-10-CM

## 2019-01-01 DIAGNOSIS — A41.9 SEPSIS, DUE TO UNSPECIFIED ORGANISM: ICD-10-CM

## 2019-01-01 LAB
ALBUMIN SERPL-MCNC: 3.7 G/DL (ref 3.5–5.2)
ALBUMIN/GLOB SERPL: 1.1 G/DL
ALP SERPL-CCNC: 84 U/L (ref 39–117)
ALT SERPL W P-5'-P-CCNC: 91 U/L (ref 1–41)
ANION GAP SERPL CALCULATED.3IONS-SCNC: 19.9 MMOL/L
AST SERPL-CCNC: 171 U/L (ref 1–40)
BACTERIA SPEC AEROBE CULT: NORMAL
BASOPHILS # BLD AUTO: 0.05 10*3/MM3 (ref 0–0.2)
BASOPHILS # BLD AUTO: 0.07 10*3/MM3 (ref 0–0.2)
BASOPHILS NFR BLD AUTO: 0.5 % (ref 0–1.5)
BASOPHILS NFR BLD AUTO: 1.1 % (ref 0–1.5)
BILIRUB SERPL-MCNC: 0.2 MG/DL (ref 0.2–1.2)
BUN BLD-MCNC: 25 MG/DL (ref 6–20)
BUN/CREAT SERPL: 32.9 (ref 7–25)
CALCIUM SPEC-SCNC: 8.8 MG/DL (ref 8.6–10.5)
CHLORIDE SERPL-SCNC: 101 MMOL/L (ref 98–107)
CO2 SERPL-SCNC: 20.1 MMOL/L (ref 22–29)
CREAT BLD-MCNC: 0.76 MG/DL (ref 0.76–1.27)
D-LACTATE SERPL-SCNC: 6.9 MMOL/L (ref 0.5–2)
DEPRECATED RDW RBC AUTO: 46.6 FL (ref 37–54)
DEPRECATED RDW RBC AUTO: 48.9 FL (ref 37–54)
EOSINOPHIL # BLD AUTO: 0.09 10*3/MM3 (ref 0–0.4)
EOSINOPHIL # BLD AUTO: 0.17 10*3/MM3 (ref 0–0.4)
EOSINOPHIL NFR BLD AUTO: 0.9 % (ref 0.3–6.2)
EOSINOPHIL NFR BLD AUTO: 2.6 % (ref 0.3–6.2)
ERYTHROCYTE [DISTWIDTH] IN BLOOD BY AUTOMATED COUNT: 13.9 % (ref 12.3–15.4)
ERYTHROCYTE [DISTWIDTH] IN BLOOD BY AUTOMATED COUNT: 15.6 % (ref 12.3–15.4)
GFR SERPL CREATININE-BSD FRML MDRD: 105 ML/MIN/1.73
GLOBULIN UR ELPH-MCNC: 3.3 GM/DL
GLUCOSE BLD-MCNC: 181 MG/DL (ref 65–99)
HCT VFR BLD AUTO: 37.6 % (ref 37.5–51)
HCT VFR BLD AUTO: 40.6 % (ref 37.5–51)
HGB BLD-MCNC: 11.4 G/DL (ref 13–17.7)
HGB BLD-MCNC: 13.1 G/DL (ref 13–17.7)
HOLD SPECIMEN: NORMAL
IMM GRANULOCYTES # BLD AUTO: 0.03 10*3/MM3 (ref 0–0.05)
IMM GRANULOCYTES # BLD AUTO: 0.08 10*3/MM3 (ref 0–0.05)
IMM GRANULOCYTES NFR BLD AUTO: 0.5 % (ref 0–0.5)
IMM GRANULOCYTES NFR BLD AUTO: 0.8 % (ref 0–0.5)
LYMPHOCYTES # BLD AUTO: 0.54 10*3/MM3 (ref 0.7–3.1)
LYMPHOCYTES # BLD AUTO: 1.27 10*3/MM3 (ref 0.7–3.1)
LYMPHOCYTES NFR BLD AUTO: 19.2 % (ref 19.6–45.3)
LYMPHOCYTES NFR BLD AUTO: 5.4 % (ref 19.6–45.3)
MAGNESIUM SERPL-MCNC: 2.1 MG/DL (ref 1.6–2.6)
MCH RBC QN AUTO: 26.8 PG (ref 26.6–33)
MCH RBC QN AUTO: 29.2 PG (ref 26.6–33)
MCHC RBC AUTO-ENTMCNC: 30.3 G/DL (ref 31.5–35.7)
MCHC RBC AUTO-ENTMCNC: 32.3 G/DL (ref 31.5–35.7)
MCV RBC AUTO: 83.2 FL (ref 79–97)
MCV RBC AUTO: 96.4 FL (ref 79–97)
MONOCYTES # BLD AUTO: 0.42 10*3/MM3 (ref 0.1–0.9)
MONOCYTES # BLD AUTO: 0.62 10*3/MM3 (ref 0.1–0.9)
MONOCYTES NFR BLD AUTO: 4.2 % (ref 5–12)
MONOCYTES NFR BLD AUTO: 9.4 % (ref 5–12)
NEUTROPHILS # BLD AUTO: 4.44 10*3/MM3 (ref 1.7–7)
NEUTROPHILS # BLD AUTO: 8.78 10*3/MM3 (ref 1.7–7)
NEUTROPHILS NFR BLD AUTO: 67.2 % (ref 42.7–76)
NEUTROPHILS NFR BLD AUTO: 88.2 % (ref 42.7–76)
NRBC BLD AUTO-RTO: 0 /100 WBC (ref 0–0.2)
NRBC BLD AUTO-RTO: 0 /100 WBC (ref 0–0.2)
PLATELET # BLD AUTO: 289 10*3/MM3 (ref 140–450)
PLATELET # BLD AUTO: 413 10*3/MM3 (ref 140–450)
PMV BLD AUTO: 9.3 FL (ref 6–12)
PMV BLD AUTO: 9.5 FL (ref 6–12)
POTASSIUM BLD-SCNC: 3.7 MMOL/L (ref 3.5–5.2)
PROT SERPL-MCNC: 7 G/DL (ref 6–8.5)
RBC # BLD AUTO: 3.9 10*6/MM3 (ref 4.14–5.8)
RBC # BLD AUTO: 4.88 10*6/MM3 (ref 4.14–5.8)
SODIUM BLD-SCNC: 141 MMOL/L (ref 136–145)
WBC NRBC COR # BLD: 6.6 10*3/MM3 (ref 3.4–10.8)
WBC NRBC COR # BLD: 9.96 10*3/MM3 (ref 3.4–10.8)

## 2019-01-01 PROCEDURE — 83605 ASSAY OF LACTIC ACID: CPT | Performed by: EMERGENCY MEDICINE

## 2019-01-01 PROCEDURE — 96365 THER/PROPH/DIAG IV INF INIT: CPT

## 2019-01-01 PROCEDURE — 80053 COMPREHEN METABOLIC PANEL: CPT | Performed by: EMERGENCY MEDICINE

## 2019-01-01 PROCEDURE — 85025 COMPLETE CBC W/AUTO DIFF WBC: CPT | Performed by: INTERNAL MEDICINE

## 2019-01-01 PROCEDURE — 25010000002 VANCOMYCIN 10 G RECONSTITUTED SOLUTION: Performed by: EMERGENCY MEDICINE

## 2019-01-01 PROCEDURE — 83735 ASSAY OF MAGNESIUM: CPT | Performed by: EMERGENCY MEDICINE

## 2019-01-01 PROCEDURE — 87040 BLOOD CULTURE FOR BACTERIA: CPT | Performed by: EMERGENCY MEDICINE

## 2019-01-01 PROCEDURE — 96367 TX/PROPH/DG ADDL SEQ IV INF: CPT

## 2019-01-01 PROCEDURE — 25010000003 LEVETIRACETAM IN NACL 0.75% 1000 MG/100ML SOLUTION: Performed by: EMERGENCY MEDICINE

## 2019-01-01 PROCEDURE — 85025 COMPLETE CBC W/AUTO DIFF WBC: CPT | Performed by: EMERGENCY MEDICINE

## 2019-01-01 PROCEDURE — 99284 EMERGENCY DEPT VISIT MOD MDM: CPT

## 2019-01-01 PROCEDURE — 70450 CT HEAD/BRAIN W/O DYE: CPT

## 2019-01-01 RX ORDER — SULFAMETHOXAZOLE AND TRIMETHOPRIM 800; 160 MG/1; MG/1
1 TABLET ORAL 2 TIMES DAILY
COMMUNITY

## 2019-01-01 RX ORDER — LEVETIRACETAM 10 MG/ML
1000 INJECTION INTRAVASCULAR ONCE
Status: COMPLETED | OUTPATIENT
Start: 2019-01-01 | End: 2019-01-01

## 2019-01-01 RX ADMIN — VANCOMYCIN HYDROCHLORIDE 1500 MG: 10 INJECTION, POWDER, LYOPHILIZED, FOR SOLUTION INTRAVENOUS at 09:40

## 2019-01-01 RX ADMIN — SODIUM CHLORIDE 1000 ML: 9 INJECTION, SOLUTION INTRAVENOUS at 09:47

## 2019-01-01 RX ADMIN — LEVETIRACETAM 1000 MG: 10 INJECTION INTRAVENOUS at 09:05

## 2019-01-01 RX ADMIN — SODIUM CHLORIDE 1000 ML: 9 INJECTION, SOLUTION INTRAVENOUS at 09:10

## 2019-06-28 NOTE — PROGRESS NOTES
[facility] OUTPATIENT FOLLOW UP CLINIC VISIT    REASON FOR FOLLOW-UP:      1. 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   2. Status post resection on 09/17/2012 at [facility].   3. Adjuvant concurrent chemoradiation initiated on 10/25/2012.   4. Sixth and final cycle of weekly carboplatin/paclitaxel administered on 11/29/2012.   5. Last radiation on 12/03/2012.   6. Hospitalized from 12/06/2012 to 12/18/2012 with falls. 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   7. Followed by Dr. Cassidy, Neurosurgery at [facility] and MRI performed at [facility] showed a 2.5 cm mass in the right frontal lobe. The patient underwent right frontal craniotomy for resection of the tumor. It was a cystic right frontal tumor. Dr. Cassidy was able to remove it en bloc on 7/20/2015. The pathology was consistent with a non-small cell carcinoma, high grade. It was a 2.3 x 2.3 x 0.6 tumor consistent with high grade non-small cell carcinoma consistent with previous primary.  8. He became less interactive with urinary incontinence, and intracranial recurrence was again noted with repeat right frontal craniotomy at  on 11/15/2016.  The operative note indicates complete removal of the tumor.      9. PET scan and MRI of the brain on 12/14/2016 and 12/18/2016 respectively show no metastatic disease and no definite evidence of residual intracranial malignancy.  Dr. Tran does not plan radiation therapy.  Observation pursued.  [facility] NGS assessment requested.  10. 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  PD-L1 testing pending as of 2/21/2017.  11. MRI imaging on 2/14/2017 shows evidence of recurrence in total measuring 19 x 16 x 15 mm.  He completed radiation by Dr. Tran.  12. MRI imaging on 5/23/2017 with significant  improvement.  13. MRI imaging on 8/21/2017 with stable findings.  14. Gamma knife to 2 lesions in January 2018  15. He developed cranial osteomyelitis with cranioplasty infection treated with long-term biotics with daptomycin/ertapenem and vancomycin/meropenem then Augmentin and Bactrim.  16. CT imaging 6/18/2019 with progression of 2 right frontal brain lesions one measuring 18 mm, previously 14 mm and one measuring 33 x 25 mm previously by 20 mm.  There is worsening edema and brain compression with a 6 mm leftward midline shift.      HISTORY OF PRESENT ILLNESS:  Irving Albarran is a 58 y.o. male who returns today for follow up of the above issue.    Unfortunately he has had some issues with osteomyelitis with cranioplasty infection.  He has required long-term antibiotics for this.  He is doing well from that standpoint.  He has been in a skilled nursing facility in [place] for rehabilitation.  He initially was doing better but over the past couple of weeks has had worsening weakness particularly on the left side such that he is very unsteady on his feet at this point into the left.  His appetite is decreased.  He is medicated and a little drowsy today.  At some point he swallowed a few coins lodged in his small intestine have not passed.  Therefore, he cannot have any further MRIs at this point.    He has no particular complaints today.  He does keep rubbing his right eye for an unknown reason.      PAST MEDICAL, SURGICAL, FAMILY, AND SOCIAL HISTORIES were reviewed with the patient and in the electronic medical record, and were updated if indicated.    ALLERGIES:  Allergies   Allergen Reactions   • Morphine And Related        MEDICATIONS:  The medication list has been reviewed with the patient by the medical assistant, and the list has been updated in the electronic medical record, which I reviewed.  Medication dosages and frequencies were confirmed to be accurate.    REVIEW OF SYSTEMS:  PAIN:  See Vital Signs  "below.  GENERAL:  No fevers, chills, night sweats, or unintended weight loss.    SKIN:  No rash  HEME/LYMPH:  No abnormal bleeding.  No palpable lymphadenopathy.  EYES:  No vision changes or diplopia.   ENT:  No sore throat or difficulty swallowing.See history of present illness  RESPIRATORY:  No cough, shortness of breath, hemoptysis, or wheezing.  Occasional tachypnea which is a behavioral issue.   CARDIOVASCULAR:  No chest pain, palpitations, orthopnea, or dyspnea on exertion.  GASTROINTESTINAL:  No abdominal pain, nausea, vomiting, constipation, diarrhea, melena, or hematochezia.  GENITOURINARY:  No dysuria or hematuria.  MUSCULOSKELETAL:  No joint pain, swelling, or erythema.  NEUROLOGIC:  No dizziness, loss of consciousness, or seizures.  PSYCHIATRIC:  No mood changes    Vitals:    06/28/19 0834   BP: 117/76   Pulse: 69   Resp: 16   Temp: 97.6 °F (36.4 °C)   TempSrc: Oral   SpO2: 97%   Weight: 77.4 kg (170 lb 9.6 oz)   Height: 173.5 cm (68.31\")   PainSc: 0-No pain  Comment: sinus cancer       PHYSICAL EXAMINATION:  GENERAL:  Well-developed well-nourished male; awake, alert and oriented, in no acute distress.  SKIN:  No rash.  HEAD:  Microcephaly is noted.  Skin flap just medial to the left eye.  Some mild excoriation of the right frontal scalp.  EYES: Mild erythema of the right eyelid.  EARS:  Hearing intact.  NOSE:  Septum midline.    MOUTH:  No stomatitis or ulcers.  Lips are normal.  THROAT:  Oropharynx without lesions or exudates.  LYMPHATICS:  No cervical, supraclavicular, axillary lymphadenopathy.  CHEST:  Lungs are clear to auscultation bilaterally.  No wheezes, rales, or rhonchi.  HEART:  Regular rate; normal rhythm.  No murmurs, gallops or rubs.  ABDOMEN:  Soft, non-tender, non-distended.  Normal active bowel sounds.  No organomegaly.  EXTREMITIES:  No clubbing, cyanosis, or edema.  NEUROLOGICAL: He is a little drowsy this morning.  Very mild left upper and lower extremity weakness.    DIAGNOSTIC " DATA:  Results for orders placed or performed in visit on 06/28/19   CBC Auto Differential   Result Value Ref Range    WBC 6.60 3.40 - 10.80 10*3/mm3    RBC 4.88 4.14 - 5.80 10*6/mm3    Hemoglobin 13.1 13.0 - 17.7 g/dL    Hematocrit 40.6 37.5 - 51.0 %    MCV 83.2 79.0 - 97.0 fL    MCH 26.8 26.6 - 33.0 pg    MCHC 32.3 31.5 - 35.7 g/dL    RDW 15.6 (H) 12.3 - 15.4 %    RDW-SD 46.6 37.0 - 54.0 fl    MPV 9.3 6.0 - 12.0 fL    Platelets 289 140 - 450 10*3/mm3    Neutrophil % 67.2 42.7 - 76.0 %    Lymphocyte % 19.2 (L) 19.6 - 45.3 %    Monocyte % 9.4 5.0 - 12.0 %    Eosinophil % 2.6 0.3 - 6.2 %    Basophil % 1.1 0.0 - 1.5 %    Immature Grans % 0.5 0.0 - 0.5 %    Neutrophils, Absolute 4.44 1.70 - 7.00 10*3/mm3    Lymphocytes, Absolute 1.27 0.70 - 3.10 10*3/mm3    Monocytes, Absolute 0.62 0.10 - 0.90 10*3/mm3    Eosinophils, Absolute 0.17 0.00 - 0.40 10*3/mm3    Basophils, Absolute 0.07 0.00 - 0.20 10*3/mm3    Immature Grans, Absolute 0.03 0.00 - 0.05 10*3/mm3    nRBC 0.0 0.0 - 0.2 /100 WBC       IMAGING:   CT imaging of the brain on 6/18/2019 shows an 18 mm right frontal lesion and a 33 mm right frontal region with increasing surrounding edema and brain compression with 6 mm leftward midline shift.      ASSESSMENT:  This is a 58 y.o. male with:   1.  History of stage IV poorly differentiated carcinoma of the left ethmoid sinus, status post surgery followed by chemoradiation.     2.  Frontal lobe metastatic lesion, resected, consistent with his previous tumor.  Second recurrence also resected on 11/15/2016.  We initially observed.  There was evidence of local recurrence and he received further radiation.  MRI imaging showed significant improvement and stabilization.  He then received gamma knife therapy in January 2018.  He subsequently developed cranial osteomyelitis treated with extended IV antibiotics.  He has had some issues with skin breakdown requiring surgeries.  Cultures from 4/12/2019 showed Enterobacter cloacae,  Klebsiella oxytoca, and MRSA.  Continue imaging is enlarging right frontal lesions with surrounding edema and brain compression and leftward midline shift.  No other surgical options are available.  No other radiation options are available.  He is now referred back to consider further systemic therapy.    His performance status is poor at this point.  I discussed with his brother and sister-in-law today the possibility of further treatment with immunotherapy, Keytruda.  We discussed the fact that I would not suspect a response rate will with this and we discussed the potential for adverse effects.  He is not able to make decisions for himself at this.  Given all the above, we do not plan for any further systemic therapy at this point and his brother and sister-in-law request referral to hospice which I think is appropriate.      PLAN:  1.  After much discussion regarding the potential risks and benefits of immunotherapy with Keytruda, we have decided that we do not think this is appropriate for him at this time.  His brother and sister-in-law have asked me to estimate how much longer he has.  I advised them that I think he probably has a few months at the most given the rate of progression of tumors at this point.  Therefore, we will make a referral to hospice.  They will need to assist with placement as his subacute rehabilitation in [place] will be complete in the near future and his family wants him to be here in [place].  I will therefore make a referral and we will not arrange routine follow-up appointment here in the office.

## 2019-06-28 NOTE — TELEPHONE ENCOUNTER
Called \[facility] [place] [facility]\""us  per Dr. Gonzalez.   Faxed over documentation.   Patient will be referred over to [facility] [place].